# Patient Record
Sex: FEMALE | Race: WHITE | Employment: OTHER | URBAN - METROPOLITAN AREA
[De-identification: names, ages, dates, MRNs, and addresses within clinical notes are randomized per-mention and may not be internally consistent; named-entity substitution may affect disease eponyms.]

---

## 2017-01-06 ENCOUNTER — HOSPITAL ENCOUNTER (OUTPATIENT)
Dept: MAMMOGRAPHY | Age: 62
Discharge: HOME | End: 2017-01-06
Attending: FAMILY MEDICINE
Payer: COMMERCIAL

## 2017-01-06 DIAGNOSIS — Z12.31 VISIT FOR SCREENING MAMMOGRAM: ICD-10-CM

## 2017-01-06 PROCEDURE — 77067 SCR MAMMO BI INCL CAD: CPT

## 2018-03-02 ENCOUNTER — HOSPITAL ENCOUNTER (OUTPATIENT)
Dept: MAMMOGRAPHY | Age: 63
Discharge: HOME | End: 2018-03-02
Attending: FAMILY MEDICINE
Payer: COMMERCIAL

## 2018-03-02 DIAGNOSIS — Z12.31 VISIT FOR SCREENING MAMMOGRAM: ICD-10-CM

## 2018-03-02 DIAGNOSIS — R92.8 ABNORMAL MAMMOGRAM: ICD-10-CM

## 2018-03-02 PROCEDURE — 77065 DX MAMMO INCL CAD UNI: CPT

## 2018-03-02 PROCEDURE — 76642 ULTRASOUND BREAST LIMITED: CPT

## 2018-03-02 PROCEDURE — 77067 SCR MAMMO BI INCL CAD: CPT

## 2018-09-04 ENCOUNTER — HOSPITAL ENCOUNTER (OUTPATIENT)
Dept: MAMMOGRAPHY | Age: 63
Discharge: HOME | End: 2018-09-04
Attending: FAMILY MEDICINE
Payer: COMMERCIAL

## 2018-09-04 DIAGNOSIS — R92.8 ABNORMAL MAMMOGRAM: ICD-10-CM

## 2018-09-04 PROCEDURE — 77065 DX MAMMO INCL CAD UNI: CPT

## 2019-03-15 ENCOUNTER — HOSPITAL ENCOUNTER (OUTPATIENT)
Dept: MAMMOGRAPHY | Age: 64
Discharge: HOME | End: 2019-03-15
Attending: FAMILY MEDICINE
Payer: COMMERCIAL

## 2019-03-15 DIAGNOSIS — Z12.39 SCREENING BREAST EXAMINATION: ICD-10-CM

## 2019-03-15 PROCEDURE — 77067 SCR MAMMO BI INCL CAD: CPT

## 2020-04-03 LAB
CREATININE, EXTERNAL: 0.83
HBA1C MFR BLD HPLC: 5.4 %
LDL-C, EXTERNAL: 51

## 2020-05-26 ENCOUNTER — HOSPITAL ENCOUNTER (OUTPATIENT)
Dept: MAMMOGRAPHY | Age: 65
Discharge: HOME | End: 2020-05-26
Attending: FAMILY MEDICINE
Payer: COMMERCIAL

## 2020-05-26 DIAGNOSIS — Z12.31 VISIT FOR SCREENING MAMMOGRAM: ICD-10-CM

## 2020-05-26 PROCEDURE — 77067 SCR MAMMO BI INCL CAD: CPT

## 2020-07-27 RX ORDER — CHLORTHALIDONE 25 MG/1
TABLET ORAL
Qty: 90 TAB | Refills: 1 | Status: SHIPPED | OUTPATIENT
Start: 2020-07-27 | End: 2021-02-15

## 2020-09-03 ENCOUNTER — TELEPHONE (OUTPATIENT)
Dept: PRIMARY CARE CLINIC | Age: 65
End: 2020-09-03

## 2020-09-04 ENCOUNTER — TELEPHONE (OUTPATIENT)
Dept: PRIMARY CARE CLINIC | Age: 65
End: 2020-09-04

## 2020-09-04 NOTE — TELEPHONE ENCOUNTER
Her [facility] paper work can be virtual. We have sooner appointment available for victuals. Please reschedule for an sooner appt.  Thank you

## 2020-09-18 VITALS
BODY MASS INDEX: 38.98 KG/M2 | HEIGHT: 63 IN | HEART RATE: 72 BPM | SYSTOLIC BLOOD PRESSURE: 132 MMHG | TEMPERATURE: 97.4 F | DIASTOLIC BLOOD PRESSURE: 88 MMHG | RESPIRATION RATE: 16 BRPM | WEIGHT: 220 LBS | OXYGEN SATURATION: 95 %

## 2020-09-18 PROBLEM — E55.9 VITAMIN D DEFICIENCY: Status: ACTIVE | Noted: 2020-09-18

## 2020-09-18 PROBLEM — E66.9 OBESITY (BMI 30-39.9): Status: ACTIVE | Noted: 2020-09-18

## 2020-09-18 RX ORDER — SITAGLIPTIN AND METFORMIN HYDROCHLORIDE 50; 1000 MG/1; MG/1
1 TABLET, FILM COATED ORAL 2 TIMES DAILY WITH MEALS
COMMUNITY
End: 2021-02-25

## 2020-09-18 RX ORDER — CETIRIZINE HYDROCHLORIDE 10 MG/1
CAPSULE, LIQUID FILLED ORAL
Status: ON HOLD | COMMUNITY
End: 2021-07-27

## 2020-09-18 RX ORDER — PREDNISONE 20 MG/1
TABLET ORAL
COMMUNITY
End: 2020-09-23

## 2020-09-18 RX ORDER — SODIUM FLUORIDE 5 MG/G
PASTE, DENTIFRICE DENTAL
COMMUNITY

## 2020-09-18 RX ORDER — EZETIMIBE 10 MG/1
10 TABLET ORAL
COMMUNITY
End: 2021-12-17

## 2020-09-18 RX ORDER — FLUOCINONIDE 0.5 MG/G
CREAM TOPICAL 2 TIMES DAILY
COMMUNITY

## 2020-09-18 RX ORDER — BLOOD-GLUCOSE METER
EACH MISCELLANEOUS
COMMUNITY

## 2020-09-18 RX ORDER — LANCETS 33 GAUGE
EACH MISCELLANEOUS
COMMUNITY

## 2020-09-18 RX ORDER — MONTELUKAST SODIUM 10 MG/1
10 TABLET ORAL
COMMUNITY
End: 2021-12-17

## 2020-09-18 RX ORDER — METFORMIN HYDROCHLORIDE 1000 MG/1
1000 TABLET ORAL 2 TIMES DAILY WITH MEALS
COMMUNITY
End: 2020-09-23

## 2020-09-18 RX ORDER — FLUTICASONE PROPIONATE 50 MCG
2 SPRAY, SUSPENSION (ML) NASAL DAILY
COMMUNITY

## 2020-09-18 RX ORDER — ROSUVASTATIN CALCIUM 5 MG/1
TABLET, COATED ORAL
COMMUNITY
End: 2020-09-23 | Stop reason: SDUPTHER

## 2020-09-23 ENCOUNTER — VIRTUAL VISIT (OUTPATIENT)
Dept: PRIMARY CARE CLINIC | Age: 65
End: 2020-09-23
Payer: COMMERCIAL

## 2020-09-23 DIAGNOSIS — M17.0 PRIMARY OSTEOARTHRITIS OF BOTH KNEES: ICD-10-CM

## 2020-09-23 DIAGNOSIS — J45.40 MODERATE PERSISTENT ASTHMA WITHOUT COMPLICATION: Chronic | ICD-10-CM

## 2020-09-23 DIAGNOSIS — E55.9 VITAMIN D DEFICIENCY: ICD-10-CM

## 2020-09-23 DIAGNOSIS — E78.00 PURE HYPERCHOLESTEROLEMIA: ICD-10-CM

## 2020-09-23 DIAGNOSIS — E11.9 TYPE 2 DIABETES MELLITUS WITHOUT COMPLICATION, WITHOUT LONG-TERM CURRENT USE OF INSULIN (HCC): Chronic | ICD-10-CM

## 2020-09-23 DIAGNOSIS — I10 ESSENTIAL HYPERTENSION, BENIGN: Primary | ICD-10-CM

## 2020-09-23 PROCEDURE — 99214 OFFICE O/P EST MOD 30 MIN: CPT | Performed by: FAMILY MEDICINE

## 2020-09-23 RX ORDER — PREDNISONE 20 MG/1
60 TABLET ORAL
Qty: 30 TAB | Refills: 0 | Status: SHIPPED | OUTPATIENT
Start: 2020-09-23 | End: 2020-10-03

## 2020-09-23 RX ORDER — ROSUVASTATIN CALCIUM 5 MG/1
5 TABLET, COATED ORAL
Qty: 90 TAB | Refills: 1 | Status: SHIPPED | OUTPATIENT
Start: 2020-09-23

## 2020-09-23 RX ORDER — NAPROXEN 250 MG/1
500 TABLET ORAL 2 TIMES DAILY WITH MEALS
Qty: 180 TAB | Refills: 1 | Status: SHIPPED | OUTPATIENT
Start: 2020-09-23 | End: 2021-12-29

## 2020-09-23 NOTE — PROGRESS NOTES
HISTORY OF PRESENT ILLNESS    Hyperlipidemia:    Lab Results   Component Value Date/Time    Cholesterol, total 128 10/01/2020 08:17 AM    HDL Cholesterol 47 10/01/2020 08:17 AM    LDL, calculated 98 03/16/2011 11:05 AM    LDL Chol Calc (NIH) 65 10/01/2020 08:17 AM    VLDL, calculated 19 03/16/2011 11:05 AM    VLDL Cholesterol Vin 16 10/01/2020 08:17 AM    Triglyceride 81 10/01/2020 08:17 AM    On crestor 5 mg, no addl arthlagias or myalgias. DM : after steroids BS is 200. Normall fasting 71 lowest  She has seen. Avg 90 120. Weight now 225 lbs. FMLA paperwork discussion. I have kept her out of office bc of covid risk. asthma,  oa -severe the knees, needs surgery but putting off for skilled nursing. [facility] [employer] [facility] but they have moved her for perventative covid contact. in office  currently2 weeks for training,but is isolated/   Work in  office one day per week . OA knees - and will work to the end of  Next year before she gets her knee replacement. . On naproxen daily    Asthma - not currently active. 1 exacerbation this year compared to prior yrs      Jeremiah Méndez is a 75 y.o. female.   Past Medical History:   Diagnosis Date    Acne     Anemia     Arrhythmia     murmur    Asthma     Cancer ([address]     malignant melanoma of skin on back    DDD (degenerative disc disease), lumbar     Heart palpitations     Hypercholesterolemia     Hypertension     Hypokalemia     Insomnia     Osteoarthrosis, unspecified whether generalized or localized, unspecified site 3/16/2011    Polycystic ovaries     Seasonal allergies     Type II diabetes mellitus with complication, uncontrolled ([address] 9/18/2020    Vitamin D deficiency      Social History     Tobacco Use    Smoking status: Never Smoker    Smokeless tobacco: Never Used   Substance Use Topics    Alcohol use: No    Drug use: Never       Family History   Problem Relation Age of Onset    Heart Disease Father     Diabetes Brother     Heart Disease Brother        Review of Systems   Constitutional: Positive for weight loss. Negative for chills, fever and malaise/fatigue. HENT: Negative for congestion. Eyes: Negative for blurred vision. Respiratory: Negative for cough, sputum production, shortness of breath and wheezing. Cardiovascular: Negative for chest pain, palpitations, orthopnea, leg swelling and PND. Gastrointestinal: Negative for abdominal pain, diarrhea, heartburn and nausea. Musculoskeletal: Positive for joint pain. Negative for falls. Skin: Negative for rash. Neurological: Negative for dizziness, tingling, weakness and headaches. Endo/Heme/Allergies: Positive for environmental allergies. Psychiatric/Behavioral: Negative for depression. The patient is nervous/anxious. The patient does not have insomnia. Physical Exam  Nursing note reviewed. Constitutional:       Appearance: Normal appearance. She is obese. HENT:      Head: Normocephalic and atraumatic. Eyes:      Extraocular Movements: Extraocular movements intact. Conjunctiva/sclera: Conjunctivae normal.   Pulmonary:      Effort: Pulmonary effort is normal. No respiratory distress. Abdominal:      Palpations: Abdomen is soft. Neurological:      General: No focal deficit present. Mental Status: She is alert and oriented to person, place, and time. Psychiatric:         Attention and Perception: Attention and perception normal.         Mood and Affect: Mood is anxious. Speech: Speech is tangential.         Behavior: Behavior normal.         Thought Content: Thought content normal. Thought content is not paranoid or delusional.         Judgment: Judgment normal.           ASSESSMENT and PLAN  Diagnoses and all orders for this visit:    1. Essential hypertension, benign  -     METABOLIC PANEL, COMPREHENSIVE  -     CBC WITH AUTOMATED DIFF  -     LIPID PANEL    2.  Type 2 diabetes mellitus without complication, without long-term current use of insulin ([facility] [address]  Comments:  doing well on meds. no concerns. Orders:  -     METABOLIC PANEL, COMPREHENSIVE  -     CBC WITH AUTOMATED DIFF  -     LIPID PANEL  -     HEMOGLOBIN A1C WITH EAG  -     MICROALBUMIN, UR, RAND W/ MICROALB/CREAT RATIO    3. Vitamin D deficiency  -     VITAMIN D, 25 HYDROXY    4. Pure hypercholesterolemia  -     METABOLIC PANEL, COMPREHENSIVE  -     LIPID PANEL    5. Primary osteoarthritis of both knees  -     URIC ACID    6. Moderate persistent asthma without complication  Comments:  much better since symbicort & singulair added. less exacerbations. will fill out FMLA. goes downhill fast when activates. RX prn  predniosne. Other orders  -     rosuvastatin (CRESTOR) 5 mg tablet; Take 1 Tab by mouth nightly. -     predniSONE (DELTASONE) 20 mg tablet; Take 60 mg by mouth daily (with breakfast) for 10 days. Indications: worsening asthma  -     naproxen (NAPROSYN) 250 mg tablet; Take 2 Tabs by mouth two (2) times daily (with meals). Orders Placed This Encounter    METABOLIC PANEL, COMPREHENSIVE    CBC WITH AUTOMATED DIFF    LIPID PANEL    HEMOGLOBIN A1C WITH EAG    VITAMIN D, 25 HYDROXY    MICROALBUMIN, UR, RAND W/ MICROALB/CREAT RATIO    URIC ACID    vit C/E/Zn/coppr/lutein/zeaxan (PRESERVISION AREDS-2 PO)    rosuvastatin (CRESTOR) 5 mg tablet    predniSONE (DELTASONE) 20 mg tablet    naproxen (NAPROSYN) 250 mg tablet     Follow-up and Dispositions    · Return in about 6 months (around 3/23/2021) for Chronic office visit.

## 2020-10-02 LAB
25(OH)D3+25(OH)D2 SERPL-MCNC: 56.5 NG/ML (ref 30–100)
ALBUMIN SERPL-MCNC: 4.4 G/DL (ref 3.8–4.8)
ALBUMIN/CREAT UR: 9 MG/G CREAT (ref 0–29)
ALBUMIN/GLOB SERPL: 2.8 {RATIO} (ref 1.2–2.2)
ALP SERPL-CCNC: 71 IU/L (ref 39–117)
ALT SERPL-CCNC: 11 IU/L (ref 0–32)
AST SERPL-CCNC: 13 IU/L (ref 0–40)
BASOPHILS # BLD AUTO: 0 X10E3/UL (ref 0–0.2)
BASOPHILS NFR BLD AUTO: 1 %
BILIRUB SERPL-MCNC: 0.6 MG/DL (ref 0–1.2)
BUN SERPL-MCNC: 19 MG/DL (ref 8–27)
BUN/CREAT SERPL: 21 (ref 12–28)
CALCIUM SERPL-MCNC: 9.6 MG/DL (ref 8.7–10.3)
CHLORIDE SERPL-SCNC: 97 MMOL/L (ref 96–106)
CHOLEST SERPL-MCNC: 128 MG/DL (ref 100–199)
CO2 SERPL-SCNC: 30 MMOL/L (ref 20–29)
CREAT SERPL-MCNC: 0.91 MG/DL (ref 0.57–1)
CREAT UR-MCNC: 108.1 MG/DL
EOSINOPHIL # BLD AUTO: 0.1 X10E3/UL (ref 0–0.4)
EOSINOPHIL NFR BLD AUTO: 2 %
ERYTHROCYTE [DISTWIDTH] IN BLOOD BY AUTOMATED COUNT: 12.6 % (ref 11.7–15.4)
EST. AVERAGE GLUCOSE BLD GHB EST-MCNC: 114 MG/DL
GLOBULIN SER CALC-MCNC: 1.6 G/DL (ref 1.5–4.5)
GLUCOSE SERPL-MCNC: 105 MG/DL (ref 65–99)
HBA1C MFR BLD: 5.6 % (ref 4.8–5.6)
HCT VFR BLD AUTO: 37.1 % (ref 34–46.6)
HDLC SERPL-MCNC: 47 MG/DL
HGB BLD-MCNC: 12.4 G/DL (ref 11.1–15.9)
IMM GRANULOCYTES # BLD AUTO: 0 X10E3/UL (ref 0–0.1)
IMM GRANULOCYTES NFR BLD AUTO: 0 %
LDLC SERPL CALC-MCNC: 65 MG/DL (ref 0–99)
LYMPHOCYTES # BLD AUTO: 0.8 X10E3/UL (ref 0.7–3.1)
LYMPHOCYTES NFR BLD AUTO: 12 %
MCH RBC QN AUTO: 29 PG (ref 26.6–33)
MCHC RBC AUTO-ENTMCNC: 33.4 G/DL (ref 31.5–35.7)
MCV RBC AUTO: 87 FL (ref 79–97)
MICROALBUMIN UR-MCNC: 9.4 UG/ML
MONOCYTES # BLD AUTO: 0.4 X10E3/UL (ref 0.1–0.9)
MONOCYTES NFR BLD AUTO: 7 %
NEUTROPHILS # BLD AUTO: 5 X10E3/UL (ref 1.4–7)
NEUTROPHILS NFR BLD AUTO: 78 %
PLATELET # BLD AUTO: 234 X10E3/UL (ref 150–450)
POTASSIUM SERPL-SCNC: 3.6 MMOL/L (ref 3.5–5.2)
PROT SERPL-MCNC: 6 G/DL (ref 6–8.5)
RBC # BLD AUTO: 4.27 X10E6/UL (ref 3.77–5.28)
SODIUM SERPL-SCNC: 139 MMOL/L (ref 134–144)
TRIGL SERPL-MCNC: 81 MG/DL (ref 0–149)
URATE SERPL-MCNC: 5.3 MG/DL (ref 2.5–7.1)
VLDLC SERPL CALC-MCNC: 16 MG/DL (ref 5–40)
WBC # BLD AUTO: 6.4 X10E3/UL (ref 3.4–10.8)

## 2020-10-02 NOTE — PROGRESS NOTES
Normal cbc, cmp, vitamin D. LDL 65 at goal.  Great a1c at 5.6.   Neg mciroalbumin and normal uric acid

## 2021-02-15 RX ORDER — CHLORTHALIDONE 25 MG/1
TABLET ORAL
Qty: 90 TAB | Refills: 0 | Status: SHIPPED | OUTPATIENT
Start: 2021-02-15

## 2021-03-09 DIAGNOSIS — J45.909 EXTRINSIC ASTHMA: ICD-10-CM

## 2021-03-09 RX ORDER — ALBUTEROL SULFATE 90 UG/1
2 AEROSOL, METERED RESPIRATORY (INHALATION)
Qty: 3 INHALER | Refills: 1 | Status: SHIPPED | OUTPATIENT
Start: 2021-03-09

## 2021-03-09 RX ORDER — FLUTICASONE PROPIONATE AND SALMETEROL 250; 50 UG/1; UG/1
1 POWDER RESPIRATORY (INHALATION) EVERY 12 HOURS
Qty: 3 INHALER | Refills: 1 | Status: SHIPPED | OUTPATIENT
Start: 2021-03-09

## 2021-04-08 ENCOUNTER — TRANSCRIBE ORDER (OUTPATIENT)
Dept: SCHEDULING | Age: 66
End: 2021-04-08

## 2021-04-08 DIAGNOSIS — Z12.31 SCREENING MAMMOGRAM FOR HIGH-RISK PATIENT: Primary | ICD-10-CM

## 2021-04-29 RX ORDER — NAPROXEN 250 MG/1
TABLET ORAL
Qty: 180 TAB | Refills: 0 | OUTPATIENT
Start: 2021-04-29

## 2021-05-12 RX ORDER — SITAGLIPTIN AND METFORMIN HYDROCHLORIDE 50; 1000 MG/1; MG/1
TABLET, FILM COATED ORAL
Qty: 180 TAB | Refills: 0 | Status: SHIPPED | OUTPATIENT
Start: 2021-05-12

## 2021-06-07 ENCOUNTER — HOSPITAL ENCOUNTER (OUTPATIENT)
Dept: MAMMOGRAPHY | Age: 66
Discharge: HOME | End: 2021-06-07
Payer: MEDICARE

## 2021-06-07 DIAGNOSIS — Z12.31 SCREENING MAMMOGRAM FOR HIGH-RISK PATIENT: ICD-10-CM

## 2021-06-07 PROCEDURE — 77067 SCR MAMMO BI INCL CAD: CPT

## 2021-07-15 RX ORDER — PREGABALIN 75 MG/1
75 CAPSULE ORAL ONCE
Status: CANCELLED | OUTPATIENT
Start: 2021-07-15 | End: 2021-07-15

## 2021-07-15 RX ORDER — ACETAMINOPHEN 500 MG
1000 TABLET ORAL ONCE
Status: CANCELLED | OUTPATIENT
Start: 2021-07-15 | End: 2021-07-15

## 2021-07-15 RX ORDER — CELECOXIB 200 MG/1
200 CAPSULE ORAL ONCE
Status: CANCELLED | OUTPATIENT
Start: 2021-07-15 | End: 2021-07-15

## 2021-07-19 NOTE — PERIOP NOTES
PT FULLY VACCINATED   MODERNA:3-10-21 AND 4-8-21  [facility]

## 2021-07-21 ENCOUNTER — HOSPITAL ENCOUNTER (OUTPATIENT)
Dept: PREADMISSION TESTING | Age: 66
Discharge: HOME | End: 2021-07-21
Payer: MEDICARE

## 2021-07-21 VITALS
SYSTOLIC BLOOD PRESSURE: 132 MMHG | DIASTOLIC BLOOD PRESSURE: 74 MMHG | BODY MASS INDEX: 40.27 KG/M2 | TEMPERATURE: 97.9 F | HEART RATE: 74 BPM | WEIGHT: 227.29 LBS | HEIGHT: 63 IN | RESPIRATION RATE: 18 BRPM

## 2021-07-21 LAB
ABO + RH BLD: NORMAL
APPEARANCE UR: CLEAR
ATRIAL RATE: 60 BPM
BACTERIA URNS QL MICRO: NEGATIVE /HPF
BILIRUB UR QL: NEGATIVE
BLOOD GROUP ANTIBODIES SERPL: NORMAL
CALCULATED P AXIS, ECG09: -3 DEGREES
CALCULATED R AXIS, ECG10: -14 DEGREES
CALCULATED T AXIS, ECG11: 15 DEGREES
COLOR UR: ABNORMAL
DIAGNOSIS, 93000: NORMAL
EPITH CASTS URNS QL MICRO: ABNORMAL /LPF
GLUCOSE UR STRIP.AUTO-MCNC: NEGATIVE MG/DL
HGB UR QL STRIP: NEGATIVE
INR PPP: 1 (ref 0.9–1.1)
KETONES UR QL STRIP.AUTO: NEGATIVE MG/DL
LEUKOCYTE ESTERASE UR QL STRIP.AUTO: ABNORMAL
NITRITE UR QL STRIP.AUTO: NEGATIVE
P-R INTERVAL, ECG05: 178 MS
PH UR STRIP: 6 [PH] (ref 5–8)
PROT UR STRIP-MCNC: NEGATIVE MG/DL
PROTHROMBIN TIME: 10.8 SEC (ref 9–11.1)
Q-T INTERVAL, ECG07: 430 MS
QRS DURATION, ECG06: 88 MS
QTC CALCULATION (BEZET), ECG08: 430 MS
RBC #/AREA URNS HPF: ABNORMAL /HPF (ref 0–5)
SP GR UR REFRACTOMETRY: 1.01 (ref 1–1.03)
SPECIMEN EXP DATE BLD: NORMAL
UA: UC IF INDICATED,UAUC: ABNORMAL
UROBILINOGEN UR QL STRIP.AUTO: 1 EU/DL (ref 0.2–1)
VENTRICULAR RATE, ECG03: 60 BPM
WBC URNS QL MICRO: ABNORMAL /HPF (ref 0–4)

## 2021-07-21 PROCEDURE — 86900 BLOOD TYPING SEROLOGIC ABO: CPT

## 2021-07-21 PROCEDURE — 85610 PROTHROMBIN TIME: CPT

## 2021-07-21 PROCEDURE — 81001 URINALYSIS AUTO W/SCOPE: CPT

## 2021-07-21 PROCEDURE — 93005 ELECTROCARDIOGRAM TRACING: CPT

## 2021-07-21 PROCEDURE — 36415 COLL VENOUS BLD VENIPUNCTURE: CPT

## 2021-07-21 NOTE — PERIOP NOTES
Preoperative and medication instructions reviewed with patient , surgical site infection sheet given,  MRSA instructions given and reviewed chg soap x 2 given and instructions on how to use chg soap correctly. Patient given opportunity to ask questions and all questions were answered. Information given   and arrival to hospital on day of surgery    CALLED PATIENT REGARDING COVID RECORD ONLY HAD ONE DATE, STATES COPY 2ND DOSE WAS STAMPED AND IT IS LIGHT, I WILL BRING [facility] ON DAY OF SURGERY.  PER PATIENT DATES OF ADMINISTRATION WERE 03/10/2021 AND 04/08/2021

## 2021-07-22 LAB
BACTERIA SPEC CULT: NORMAL
BACTERIA SPEC CULT: NORMAL
SERVICE CMNT-IMP: NORMAL

## 2021-07-22 NOTE — PERIOP NOTES
PAT Nurse Practitioner   Pre-Operative Chart Review/Assessment:-ORTHOPEDIC                Patient Name:  Mariana Ramirez                                                           Age:   77 y.o.    :  1955     Today's Date:  2021     Date of PAT:   2021      Date of Surgery:    2021      Procedure(s):  Left Total Knee Arthroplasty     Surgeon:   Dr. Vale Burnett:      1)  Medical Clearance:  Dr. Desiree Wells      2)  Cardiac Clearance:  EKG and METs reviewed. No further cardiac evaluation requested. 3)  Diabetic Treatment Consult:  Not indicated. A1c-5.6      4)  Sleep Apnea evaluation:   Not indicated.  JOHNNA Score 2.       5) Treatment for MRSA/Staph Aureus:  Neg      6) Additional Concerns:  PONV, HTN, Asthma, T2DM, hx of anemia, obesity                Vital Signs:         Vitals:    21 0841   BP: 132/74   Pulse: 74   Resp: 18   Temp: 97.9 °F (36.6 °C)   Weight: 103.1 kg (227 lb 4.7 oz)   Height: 5' 3\" (1.6 m)            ____________________________________________  PAST MEDICAL HISTORY  Past Medical History:   Diagnosis Date    Acne     Anemia     Arrhythmia     murmur    Asthma     Cancer ([facility] [address]     malignant melanoma of skin on back    DDD (degenerative disc disease), lumbar     Heart palpitations     Hypercholesterolemia     Hypertension     Hypokalemia     Insomnia     Nausea & vomiting     Osteoarthrosis, unspecified whether generalized or localized, unspecified site 3/16/2011    Polycystic ovaries     Seasonal allergies     Type II diabetes mellitus with complication, uncontrolled ([facility] [address] 2020    Vitamin D deficiency       ____________________________________________  PAST SURGICAL HISTORY  Past Surgical History:   Procedure Laterality Date    HX COLONOSCOPY      HX DILATION AND CURETTAGE      HX HYSTERECTOMY  1986    parital    HX OOPHORECTOMY Right 1986    HX TONSILLECTOMY      [address] ____________________________________________  HOME MEDICATIONS  Current Outpatient Medications   Medication Sig    Janumet 50-1,000 mg per tablet TAKE 1 TABLET TWICE A DAY    fluticasone propion-salmeteroL (Advair Diskus) 250-50 mcg/dose diskus inhaler Take 1 Puff by inhalation every twelve (12) hours.  albuterol (PROVENTIL HFA, VENTOLIN HFA, PROAIR HFA) 90 mcg/actuation inhaler Take 2 Puffs by inhalation every four (4) hours as needed for Wheezing. Indications: asthma attack    chlorthalidone (HYGROTON) 25 mg tablet TAKE ONE TABLET BY MOUTH DAILY    vit C/E/Zn/coppr/lutein/zeaxan (PRESERVISION AREDS-2 PO) Take  by mouth.  naproxen (NAPROSYN) 250 mg tablet Take 2 Tabs by mouth two (2) times daily (with meals).  fluocinoNIDE (LIDEX) 0.05 % topical cream Apply  to affected area two (2) times a day.  montelukast (SINGULAIR) 10 mg tablet Take 10 mg by mouth nightly as needed.  lancets (One Touch Delica) 33 gauge misc by Does Not Apply route.  glucose blood VI test strips (OneTouch Ultra Blue Test Strip) strip by Does Not Apply route See Admin Instructions.  Blood-Glucose Meter (OneTouch Ultra2 Meter) misc by Does Not Apply route.  Inhalational Spacing Device (POCKET CHAMBER) by Does Not Apply route as needed.  clobetasol (TEMOVATE) 0.05 % ointment Apply  to affected area two (2) times a day.  thiamine (VITAMIN B-1) 100 mg tablet Take  by mouth daily.  aspirin delayed-release (ASPIR-81) 81 mg tablet Take  by mouth daily.  rosuvastatin (CRESTOR) 5 mg tablet Take 1 Tab by mouth nightly.  fluoride, sodium, (Denta 5000 Plus) 1.1 % crea by Dental route.  ezetimibe (ZETIA) 10 mg tablet Take 10 mg by mouth every morning.  fluticasone propionate (Flonase Allergy Relief) 50 mcg/actuation nasal spray 2 Sprays by Both Nostrils route daily.  Cetirizine (ZyrTEC) 10 mg cap Take  by mouth.     albuterol (PROVENTIL, VENTOLIN) 90 mcg/Actuation inhaler Take 2 Puffs by inhalation every four (4) hours as needed.  cholecalciferol, vitamin D3, (VITAMIN D-3) 2,000 unit Tab Take  by mouth. No current facility-administered medications for this encounter.      ____________________________________________  ALLERGIES  Allergies   Allergen Reactions    Sulfa (Sulfonamide Antibiotics) Hives    Tape [Adhesive] Other (comments)     RASH AND TAPE BURNS    Wellbutrin [Bupropion Hcl] Other (comments)     Hypertensive crisis      ____________________________________________  SOCIAL HISTORY  Social History     Tobacco Use    Smoking status: Never Smoker    Smokeless tobacco: Never Used   Substance Use Topics    Alcohol use: No      ____________________________________________        Labs:     Hospital Outpatient Visit on 07/21/2021   Component Date Value Ref Range Status    Crossmatch Expiration 07/21/2021 07/30/2021,2359   Final    ABO/Rh(D) 07/21/2021 A POSITIVE   Final    Antibody screen 07/21/2021 NEG   Final    INR 07/21/2021 1.0  0.9 - 1.1   Final    A single therapeutic range for Vit K antagonists may not be optimal for all indications - see June, 2008 issue of Chest, American College of Chest Physicians Evidence-Based Clinical Practice Guidelines, 8th Edition.     Prothrombin time 07/21/2021 10.8  9.0 - 11.1 sec Final    Color 07/21/2021 YELLOW/STRAW    Final    Color Reference Range: Straw, Yellow or Dark Yellow    Appearance 07/21/2021 CLEAR  CLEAR   Final    Specific gravity 07/21/2021 1.015  1.003 - 1.030   Final    pH (UA) 07/21/2021 6.0  5.0 - 8.0   Final    Protein 07/21/2021 Negative  NEG mg/dL Final    Glucose 07/21/2021 Negative  NEG mg/dL Final    Ketone 07/21/2021 Negative  NEG mg/dL Final    Bilirubin 07/21/2021 Negative  NEG   Final    Blood 07/21/2021 Negative  NEG   Final    Urobilinogen 07/21/2021 1.0  0.2 - 1.0 EU/dL Final    Nitrites 07/21/2021 Negative  NEG   Final    Leukocyte Esterase 07/21/2021 SMALL* NEG   Final    WBC 07/21/2021 0-4  0 - 4 /hpf Final  RBC 07/21/2021 0-5  0 - 5 /hpf Final    Epithelial cells 07/21/2021 FEW  FEW /lpf Final    Epithelial cell category consists of squamous cells and /or transitional urothelial cells. Renal tubular cells, if present, are separately identified as such.  Bacteria 07/21/2021 Negative  NEG /hpf Final    UA:UC IF INDICATED 07/21/2021 CULTURE NOT INDICATED BY UA RESULT  CNI   Final    Ventricular Rate 07/21/2021 60  BPM Final    Atrial Rate 07/21/2021 60  BPM Final    P-R Interval 07/21/2021 178  ms Final    QRS Duration 07/21/2021 88  ms Final    Q-T Interval 07/21/2021 430  ms Final    QTC Calculation (Bezet) 07/21/2021 430  ms Final    Calculated P Axis 07/21/2021 -3  degrees Final    Calculated R Axis 07/21/2021 -14  degrees Final    Calculated T Axis 07/21/2021 15  degrees Final    Diagnosis 07/21/2021    Final                    Value:Normal sinus rhythm  Minimal voltage criteria for LVH, may be normal variant  Borderline ECG  No previous ECGs available  Confirmed by Juliane Jean Baptiste ([id number]) on 7/21/2021 9:58:43 AM      Special Requests: 07/21/2021 NO SPECIAL REQUESTS    Final    Culture result: 07/21/2021 MRSA NOT PRESENT    Final       Skin:     Denies open wounds, cuts, sores, rashes or other areas of concern in PAT assessment.           Cristobal Miller NP

## 2021-07-27 ENCOUNTER — HOSPITAL ENCOUNTER (OUTPATIENT)
Age: 66
Discharge: HOME HEALTH | End: 2021-07-28
Attending: ORTHOPAEDIC SURGERY | Admitting: ORTHOPAEDIC SURGERY
Payer: MEDICARE

## 2021-07-27 ENCOUNTER — ANESTHESIA EVENT (OUTPATIENT)
Dept: SURGERY | Age: 66
End: 2021-07-27
Payer: MEDICARE

## 2021-07-27 ENCOUNTER — ANESTHESIA (OUTPATIENT)
Dept: SURGERY | Age: 66
End: 2021-07-27
Payer: MEDICARE

## 2021-07-27 DIAGNOSIS — M17.12 PRIMARY OSTEOARTHRITIS OF LEFT KNEE: Primary | ICD-10-CM

## 2021-07-27 LAB
GLUCOSE BLD STRIP.AUTO-MCNC: 112 MG/DL (ref 65–117)
GLUCOSE BLD STRIP.AUTO-MCNC: 236 MG/DL (ref 65–117)
SERVICE CMNT-IMP: ABNORMAL
SERVICE CMNT-IMP: NORMAL

## 2021-07-27 PROCEDURE — 77030006807 HC BLD SAW RECIP KMET -B: Performed by: ORTHOPAEDIC SURGERY

## 2021-07-27 PROCEDURE — 77030005513 HC CATH URETH FOL11 MDII -B: Performed by: ORTHOPAEDIC SURGERY

## 2021-07-27 PROCEDURE — 97161 PT EVAL LOW COMPLEX 20 MIN: CPT

## 2021-07-27 PROCEDURE — 74011250636 HC RX REV CODE- 250/636: Performed by: PHYSICIAN ASSISTANT

## 2021-07-27 PROCEDURE — 74011250636 HC RX REV CODE- 250/636: Performed by: NURSE ANESTHETIST, CERTIFIED REGISTERED

## 2021-07-27 PROCEDURE — 77030010507 HC ADH SKN DERMBND J&J -B: Performed by: ORTHOPAEDIC SURGERY

## 2021-07-27 PROCEDURE — 77030028907 HC WRP KNEE WO BGS SOLM -B

## 2021-07-27 PROCEDURE — 77030018673: Performed by: ORTHOPAEDIC SURGERY

## 2021-07-27 PROCEDURE — 76060000036 HC ANESTHESIA 2.5 TO 3 HR: Performed by: ORTHOPAEDIC SURGERY

## 2021-07-27 PROCEDURE — 74011250637 HC RX REV CODE- 250/637: Performed by: PHYSICIAN ASSISTANT

## 2021-07-27 PROCEDURE — 2709999900 HC NON-CHARGEABLE SUPPLY: Performed by: ORTHOPAEDIC SURGERY

## 2021-07-27 PROCEDURE — C1776 JOINT DEVICE (IMPLANTABLE): HCPCS | Performed by: ORTHOPAEDIC SURGERY

## 2021-07-27 PROCEDURE — C1713 ANCHOR/SCREW BN/BN,TIS/BN: HCPCS | Performed by: ORTHOPAEDIC SURGERY

## 2021-07-27 PROCEDURE — 74011000250 HC RX REV CODE- 250: Performed by: ORTHOPAEDIC SURGERY

## 2021-07-27 PROCEDURE — 77030002933 HC SUT MCRYL J&J -A: Performed by: ORTHOPAEDIC SURGERY

## 2021-07-27 PROCEDURE — 77030000032 HC CUF TRNQT ZIMM -B: Performed by: ORTHOPAEDIC SURGERY

## 2021-07-27 PROCEDURE — 74011636637 HC RX REV CODE- 636/637: Performed by: PHYSICIAN ASSISTANT

## 2021-07-27 PROCEDURE — 77030012935 HC DRSG AQUACEL BMS -B: Performed by: ORTHOPAEDIC SURGERY

## 2021-07-27 PROCEDURE — 74011000250 HC RX REV CODE- 250: Performed by: PHYSICIAN ASSISTANT

## 2021-07-27 PROCEDURE — 76210000006 HC OR PH I REC 0.5 TO 1 HR: Performed by: ORTHOPAEDIC SURGERY

## 2021-07-27 PROCEDURE — 77030040750 HC INSTR NAV SYS DISP ORLN -G: Performed by: ORTHOPAEDIC SURGERY

## 2021-07-27 PROCEDURE — 82962 GLUCOSE BLOOD TEST: CPT

## 2021-07-27 PROCEDURE — 77030039497 HC CST PAD STERILE CHCS -A: Performed by: ORTHOPAEDIC SURGERY

## 2021-07-27 PROCEDURE — 74011250636 HC RX REV CODE- 250/636: Performed by: ANESTHESIOLOGY

## 2021-07-27 PROCEDURE — 77030014077 HC TOWER MX CEM J&J -C: Performed by: ORTHOPAEDIC SURGERY

## 2021-07-27 PROCEDURE — 77030007866 HC KT SPN ANES BBMI -B: Performed by: ANESTHESIOLOGY

## 2021-07-27 PROCEDURE — 97116 GAIT TRAINING THERAPY: CPT

## 2021-07-27 PROCEDURE — 77030033067 HC SUT PDO STRATFX SPIR J&J -B: Performed by: ORTHOPAEDIC SURGERY

## 2021-07-27 PROCEDURE — 77030006835 HC BLD SAW SAG STRY -B: Performed by: ORTHOPAEDIC SURGERY

## 2021-07-27 PROCEDURE — 77030031139 HC SUT VCRL2 J&J -A: Performed by: ORTHOPAEDIC SURGERY

## 2021-07-27 PROCEDURE — 76010000172 HC OR TIME 2.5 TO 3 HR INTENSV-TIER 1: Performed by: ORTHOPAEDIC SURGERY

## 2021-07-27 PROCEDURE — 2709999900 HC NON-CHARGEABLE SUPPLY

## 2021-07-27 PROCEDURE — 74011250636 HC RX REV CODE- 250/636: Performed by: ORTHOPAEDIC SURGERY

## 2021-07-27 PROCEDURE — 74011250637 HC RX REV CODE- 250/637: Performed by: ORTHOPAEDIC SURGERY

## 2021-07-27 PROCEDURE — 74011000250 HC RX REV CODE- 250: Performed by: NURSE ANESTHETIST, CERTIFIED REGISTERED

## 2021-07-27 PROCEDURE — 74011000258 HC RX REV CODE- 258: Performed by: NURSE ANESTHETIST, CERTIFIED REGISTERED

## 2021-07-27 DEVICE — DOMED TRI-PEG PATELLA, 35X9MM, E-PLUS
Type: IMPLANTABLE DEVICE | Site: KNEE | Status: FUNCTIONAL
Brand: DJO SURGICAL

## 2021-07-27 DEVICE — EMPOWR 3D KNEETM INS, 8L 14MM, VE
Type: IMPLANTABLE DEVICE | Site: KNEE | Status: FUNCTIONAL
Brand: DJO SURGICAL

## 2021-07-27 DEVICE — SMARTSET GMV HIGH PERFORMANCE GENTAMICIN MEDIUM VISCOSITY BONE CEMENT 40G
Type: IMPLANTABLE DEVICE | Site: KNEE | Status: FUNCTIONAL
Brand: SMARTSET

## 2021-07-27 DEVICE — DJO EMPOWR KNEETM, FIN BP, NP, 8L
Type: IMPLANTABLE DEVICE | Site: KNEE | Status: FUNCTIONAL
Brand: DJO SURGICAL

## 2021-07-27 DEVICE — EMPOWR 3D KNEETM FEMUR, NP, 8L
Type: IMPLANTABLE DEVICE | Site: KNEE | Status: FUNCTIONAL
Brand: DJO SURGICAL

## 2021-07-27 RX ORDER — SODIUM CHLORIDE 0.9 % (FLUSH) 0.9 %
5-40 SYRINGE (ML) INJECTION AS NEEDED
Status: DISCONTINUED | OUTPATIENT
Start: 2021-07-27 | End: 2021-07-27 | Stop reason: HOSPADM

## 2021-07-27 RX ORDER — HYDROMORPHONE HYDROCHLORIDE 1 MG/ML
0.5 INJECTION, SOLUTION INTRAMUSCULAR; INTRAVENOUS; SUBCUTANEOUS
Status: DISCONTINUED | OUTPATIENT
Start: 2021-07-27 | End: 2021-07-28 | Stop reason: HOSPADM

## 2021-07-27 RX ORDER — FENTANYL CITRATE 50 UG/ML
50 INJECTION, SOLUTION INTRAMUSCULAR; INTRAVENOUS AS NEEDED
Status: DISCONTINUED | OUTPATIENT
Start: 2021-07-27 | End: 2021-07-27 | Stop reason: HOSPADM

## 2021-07-27 RX ORDER — PROPOFOL 10 MG/ML
INJECTION, EMULSION INTRAVENOUS
Status: DISCONTINUED | OUTPATIENT
Start: 2021-07-27 | End: 2021-07-27 | Stop reason: HOSPADM

## 2021-07-27 RX ORDER — AMOXICILLIN 250 MG
1 CAPSULE ORAL
Qty: 60 TABLET | Refills: 0 | Status: SHIPPED | OUTPATIENT
Start: 2021-07-27 | End: 2021-12-17

## 2021-07-27 RX ORDER — FENTANYL CITRATE 50 UG/ML
INJECTION, SOLUTION INTRAMUSCULAR; INTRAVENOUS AS NEEDED
Status: DISCONTINUED | OUTPATIENT
Start: 2021-07-27 | End: 2021-07-27 | Stop reason: HOSPADM

## 2021-07-27 RX ORDER — DEXAMETHASONE SODIUM PHOSPHATE 4 MG/ML
INJECTION, SOLUTION INTRA-ARTICULAR; INTRALESIONAL; INTRAMUSCULAR; INTRAVENOUS; SOFT TISSUE AS NEEDED
Status: DISCONTINUED | OUTPATIENT
Start: 2021-07-27 | End: 2021-07-27 | Stop reason: HOSPADM

## 2021-07-27 RX ORDER — SODIUM CHLORIDE 9 MG/ML
125 INJECTION, SOLUTION INTRAVENOUS CONTINUOUS
Status: DISCONTINUED | OUTPATIENT
Start: 2021-07-27 | End: 2021-07-28 | Stop reason: HOSPADM

## 2021-07-27 RX ORDER — OXYCODONE HYDROCHLORIDE 5 MG/1
5 TABLET ORAL
Status: DISCONTINUED | OUTPATIENT
Start: 2021-07-27 | End: 2021-07-28 | Stop reason: HOSPADM

## 2021-07-27 RX ORDER — PREGABALIN 75 MG/1
75 CAPSULE ORAL ONCE
Status: COMPLETED | OUTPATIENT
Start: 2021-07-27 | End: 2021-07-27

## 2021-07-27 RX ORDER — MIDAZOLAM HYDROCHLORIDE 1 MG/ML
1 INJECTION, SOLUTION INTRAMUSCULAR; INTRAVENOUS AS NEEDED
Status: DISCONTINUED | OUTPATIENT
Start: 2021-07-27 | End: 2021-07-27 | Stop reason: HOSPADM

## 2021-07-27 RX ORDER — ROPIVACAINE HYDROCHLORIDE 5 MG/ML
INJECTION, SOLUTION EPIDURAL; INFILTRATION; PERINEURAL
Status: COMPLETED | OUTPATIENT
Start: 2021-07-27 | End: 2021-07-27

## 2021-07-27 RX ORDER — ASPIRIN 81 MG/1
81 TABLET ORAL 2 TIMES DAILY
Qty: 60 TABLET | Refills: 0 | Status: SHIPPED
Start: 2021-07-27 | End: 2021-12-29

## 2021-07-27 RX ORDER — SODIUM CHLORIDE 0.9 % (FLUSH) 0.9 %
5-40 SYRINGE (ML) INJECTION EVERY 8 HOURS
Status: DISCONTINUED | OUTPATIENT
Start: 2021-07-27 | End: 2021-07-27 | Stop reason: HOSPADM

## 2021-07-27 RX ORDER — SODIUM CHLORIDE, SODIUM LACTATE, POTASSIUM CHLORIDE, CALCIUM CHLORIDE 600; 310; 30; 20 MG/100ML; MG/100ML; MG/100ML; MG/100ML
25 INJECTION, SOLUTION INTRAVENOUS CONTINUOUS
Status: DISCONTINUED | OUTPATIENT
Start: 2021-07-27 | End: 2021-07-27 | Stop reason: HOSPADM

## 2021-07-27 RX ORDER — CELECOXIB 200 MG/1
200 CAPSULE ORAL ONCE
Status: COMPLETED | OUTPATIENT
Start: 2021-07-27 | End: 2021-07-27

## 2021-07-27 RX ORDER — EZETIMIBE 10 MG/1
10 TABLET ORAL
Status: DISCONTINUED | OUTPATIENT
Start: 2021-07-28 | End: 2021-07-28 | Stop reason: HOSPADM

## 2021-07-27 RX ORDER — FACIAL-BODY WIPES
10 EACH TOPICAL DAILY PRN
Status: DISCONTINUED | OUTPATIENT
Start: 2021-07-29 | End: 2021-07-28 | Stop reason: HOSPADM

## 2021-07-27 RX ORDER — SODIUM CHLORIDE, SODIUM LACTATE, POTASSIUM CHLORIDE, CALCIUM CHLORIDE 600; 310; 30; 20 MG/100ML; MG/100ML; MG/100ML; MG/100ML
125 INJECTION, SOLUTION INTRAVENOUS CONTINUOUS
Status: DISCONTINUED | OUTPATIENT
Start: 2021-07-27 | End: 2021-07-27 | Stop reason: HOSPADM

## 2021-07-27 RX ORDER — OXYCODONE HYDROCHLORIDE 5 MG/1
2.5 TABLET ORAL
Status: DISCONTINUED | OUTPATIENT
Start: 2021-07-27 | End: 2021-07-28 | Stop reason: HOSPADM

## 2021-07-27 RX ORDER — OXYCODONE HYDROCHLORIDE 5 MG/1
5 TABLET ORAL AS NEEDED
Status: DISCONTINUED | OUTPATIENT
Start: 2021-07-27 | End: 2021-07-27 | Stop reason: HOSPADM

## 2021-07-27 RX ORDER — LIDOCAINE HYDROCHLORIDE 10 MG/ML
0.1 INJECTION, SOLUTION EPIDURAL; INFILTRATION; INTRACAUDAL; PERINEURAL AS NEEDED
Status: DISCONTINUED | OUTPATIENT
Start: 2021-07-27 | End: 2021-07-27 | Stop reason: HOSPADM

## 2021-07-27 RX ORDER — ONDANSETRON 2 MG/ML
4 INJECTION INTRAMUSCULAR; INTRAVENOUS AS NEEDED
Status: DISCONTINUED | OUTPATIENT
Start: 2021-07-27 | End: 2021-07-27 | Stop reason: HOSPADM

## 2021-07-27 RX ORDER — MIDAZOLAM HYDROCHLORIDE 1 MG/ML
0.5 INJECTION, SOLUTION INTRAMUSCULAR; INTRAVENOUS
Status: DISCONTINUED | OUTPATIENT
Start: 2021-07-27 | End: 2021-07-27 | Stop reason: HOSPADM

## 2021-07-27 RX ORDER — KETOROLAC TROMETHAMINE 30 MG/ML
15 INJECTION, SOLUTION INTRAMUSCULAR; INTRAVENOUS EVERY 6 HOURS
Status: DISCONTINUED | OUTPATIENT
Start: 2021-07-27 | End: 2021-07-28 | Stop reason: HOSPADM

## 2021-07-27 RX ORDER — MORPHINE SULFATE 2 MG/ML
2 INJECTION, SOLUTION INTRAMUSCULAR; INTRAVENOUS
Status: DISCONTINUED | OUTPATIENT
Start: 2021-07-27 | End: 2021-07-27 | Stop reason: HOSPADM

## 2021-07-27 RX ORDER — PROPOFOL 10 MG/ML
INJECTION, EMULSION INTRAVENOUS AS NEEDED
Status: DISCONTINUED | OUTPATIENT
Start: 2021-07-27 | End: 2021-07-27 | Stop reason: HOSPADM

## 2021-07-27 RX ORDER — ACETAMINOPHEN 500 MG
500 TABLET ORAL EVERY 4 HOURS
Qty: 100 TABLET | Refills: 0 | Status: SHIPPED
Start: 2021-07-27

## 2021-07-27 RX ORDER — ASPIRIN 81 MG/1
81 TABLET ORAL 2 TIMES DAILY
Status: DISCONTINUED | OUTPATIENT
Start: 2021-07-27 | End: 2021-07-28 | Stop reason: HOSPADM

## 2021-07-27 RX ORDER — MAGNESIUM SULFATE 100 %
4 CRYSTALS MISCELLANEOUS AS NEEDED
Status: DISCONTINUED | OUTPATIENT
Start: 2021-07-27 | End: 2021-07-28 | Stop reason: HOSPADM

## 2021-07-27 RX ORDER — ONDANSETRON 2 MG/ML
INJECTION INTRAMUSCULAR; INTRAVENOUS AS NEEDED
Status: DISCONTINUED | OUTPATIENT
Start: 2021-07-27 | End: 2021-07-27 | Stop reason: HOSPADM

## 2021-07-27 RX ORDER — KETAMINE HYDROCHLORIDE 10 MG/ML
INJECTION, SOLUTION INTRAMUSCULAR; INTRAVENOUS AS NEEDED
Status: DISCONTINUED | OUTPATIENT
Start: 2021-07-27 | End: 2021-07-27 | Stop reason: HOSPADM

## 2021-07-27 RX ORDER — DIPHENHYDRAMINE HYDROCHLORIDE 50 MG/ML
12.5 INJECTION, SOLUTION INTRAMUSCULAR; INTRAVENOUS AS NEEDED
Status: DISCONTINUED | OUTPATIENT
Start: 2021-07-27 | End: 2021-07-27 | Stop reason: HOSPADM

## 2021-07-27 RX ORDER — FENTANYL CITRATE 50 UG/ML
25 INJECTION, SOLUTION INTRAMUSCULAR; INTRAVENOUS
Status: DISCONTINUED | OUTPATIENT
Start: 2021-07-27 | End: 2021-07-27 | Stop reason: HOSPADM

## 2021-07-27 RX ORDER — AMOXICILLIN 250 MG
1 CAPSULE ORAL 2 TIMES DAILY
Status: DISCONTINUED | OUTPATIENT
Start: 2021-07-27 | End: 2021-07-28 | Stop reason: HOSPADM

## 2021-07-27 RX ORDER — HYDROXYZINE HYDROCHLORIDE 10 MG/1
10 TABLET, FILM COATED ORAL
Status: DISCONTINUED | OUTPATIENT
Start: 2021-07-27 | End: 2021-07-28 | Stop reason: HOSPADM

## 2021-07-27 RX ORDER — BUPIVACAINE HYDROCHLORIDE 7.5 MG/ML
INJECTION, SOLUTION EPIDURAL; RETROBULBAR AS NEEDED
Status: DISCONTINUED | OUTPATIENT
Start: 2021-07-27 | End: 2021-07-27

## 2021-07-27 RX ORDER — OXYCODONE HYDROCHLORIDE 5 MG/1
2.5-5 TABLET ORAL
Qty: 42 TABLET | Refills: 0 | Status: SHIPPED | OUTPATIENT
Start: 2021-07-27 | End: 2021-08-03

## 2021-07-27 RX ORDER — INSULIN LISPRO 100 [IU]/ML
INJECTION, SOLUTION INTRAVENOUS; SUBCUTANEOUS
Status: DISCONTINUED | OUTPATIENT
Start: 2021-07-27 | End: 2021-07-28 | Stop reason: HOSPADM

## 2021-07-27 RX ORDER — SODIUM CHLORIDE 9 MG/ML
25 INJECTION, SOLUTION INTRAVENOUS CONTINUOUS
Status: DISCONTINUED | OUTPATIENT
Start: 2021-07-27 | End: 2021-07-27 | Stop reason: HOSPADM

## 2021-07-27 RX ORDER — HYDROMORPHONE HYDROCHLORIDE 1 MG/ML
0.2 INJECTION, SOLUTION INTRAMUSCULAR; INTRAVENOUS; SUBCUTANEOUS
Status: DISCONTINUED | OUTPATIENT
Start: 2021-07-27 | End: 2021-07-27 | Stop reason: HOSPADM

## 2021-07-27 RX ORDER — DEXTROSE 50 % IN WATER (D50W) INTRAVENOUS SYRINGE
12.5-25 AS NEEDED
Status: DISCONTINUED | OUTPATIENT
Start: 2021-07-27 | End: 2021-07-28 | Stop reason: HOSPADM

## 2021-07-27 RX ORDER — BUPIVACAINE HYDROCHLORIDE 5 MG/ML
INJECTION, SOLUTION EPIDURAL; INTRACAUDAL AS NEEDED
Status: DISCONTINUED | OUTPATIENT
Start: 2021-07-27 | End: 2021-07-27 | Stop reason: HOSPADM

## 2021-07-27 RX ORDER — SODIUM CHLORIDE, SODIUM LACTATE, POTASSIUM CHLORIDE, CALCIUM CHLORIDE 600; 310; 30; 20 MG/100ML; MG/100ML; MG/100ML; MG/100ML
INJECTION, SOLUTION INTRAVENOUS
Status: DISCONTINUED | OUTPATIENT
Start: 2021-07-27 | End: 2021-07-27 | Stop reason: HOSPADM

## 2021-07-27 RX ORDER — SODIUM CHLORIDE 0.9 % (FLUSH) 0.9 %
5-40 SYRINGE (ML) INJECTION AS NEEDED
Status: DISCONTINUED | OUTPATIENT
Start: 2021-07-27 | End: 2021-07-28 | Stop reason: HOSPADM

## 2021-07-27 RX ORDER — ACETAMINOPHEN 500 MG
500 TABLET ORAL
Status: DISCONTINUED | OUTPATIENT
Start: 2021-07-27 | End: 2021-07-28 | Stop reason: HOSPADM

## 2021-07-27 RX ORDER — ACETAMINOPHEN 500 MG
1000 TABLET ORAL ONCE
Status: COMPLETED | OUTPATIENT
Start: 2021-07-27 | End: 2021-07-27

## 2021-07-27 RX ORDER — NALOXONE HYDROCHLORIDE 0.4 MG/ML
0.4 INJECTION, SOLUTION INTRAMUSCULAR; INTRAVENOUS; SUBCUTANEOUS AS NEEDED
Status: DISCONTINUED | OUTPATIENT
Start: 2021-07-27 | End: 2021-07-28 | Stop reason: HOSPADM

## 2021-07-27 RX ORDER — SODIUM CHLORIDE 9 MG/ML
INJECTION, SOLUTION INTRAVENOUS
Status: DISCONTINUED | OUTPATIENT
Start: 2021-07-27 | End: 2021-07-27 | Stop reason: HOSPADM

## 2021-07-27 RX ORDER — ONDANSETRON 2 MG/ML
4 INJECTION INTRAMUSCULAR; INTRAVENOUS
Status: DISCONTINUED | OUTPATIENT
Start: 2021-07-27 | End: 2021-07-28 | Stop reason: HOSPADM

## 2021-07-27 RX ORDER — ACETAMINOPHEN 325 MG/1
650 TABLET ORAL ONCE
Status: DISCONTINUED | OUTPATIENT
Start: 2021-07-27 | End: 2021-07-27 | Stop reason: SDUPTHER

## 2021-07-27 RX ORDER — ROSUVASTATIN CALCIUM 10 MG/1
5 TABLET, COATED ORAL
Status: DISCONTINUED | OUTPATIENT
Start: 2021-07-27 | End: 2021-07-28 | Stop reason: HOSPADM

## 2021-07-27 RX ORDER — POLYETHYLENE GLYCOL 3350 17 G/17G
17 POWDER, FOR SOLUTION ORAL DAILY
Status: DISCONTINUED | OUTPATIENT
Start: 2021-07-28 | End: 2021-07-28 | Stop reason: HOSPADM

## 2021-07-27 RX ORDER — SODIUM CHLORIDE 0.9 % (FLUSH) 0.9 %
5-40 SYRINGE (ML) INJECTION EVERY 8 HOURS
Status: DISCONTINUED | OUTPATIENT
Start: 2021-07-27 | End: 2021-07-28 | Stop reason: HOSPADM

## 2021-07-27 RX ORDER — MIDAZOLAM HYDROCHLORIDE 1 MG/ML
INJECTION, SOLUTION INTRAMUSCULAR; INTRAVENOUS AS NEEDED
Status: DISCONTINUED | OUTPATIENT
Start: 2021-07-27 | End: 2021-07-27 | Stop reason: HOSPADM

## 2021-07-27 RX ADMIN — PREGABALIN 75 MG: 75 CAPSULE ORAL at 09:47

## 2021-07-27 RX ADMIN — PROPOFOL 20 MG: 10 INJECTION, EMULSION INTRAVENOUS at 10:41

## 2021-07-27 RX ADMIN — PROPOFOL 40 MCG/KG/MIN: 10 INJECTION, EMULSION INTRAVENOUS at 10:39

## 2021-07-27 RX ADMIN — FENTANYL CITRATE 100 MCG: 50 INJECTION, SOLUTION INTRAMUSCULAR; INTRAVENOUS at 10:10

## 2021-07-27 RX ADMIN — PROPOFOL 20 MG: 10 INJECTION, EMULSION INTRAVENOUS at 10:39

## 2021-07-27 RX ADMIN — OXYCODONE 5 MG: 5 TABLET ORAL at 20:46

## 2021-07-27 RX ADMIN — DOCUSATE SODIUM 50 MG AND SENNOSIDES 8.6 MG 1 TABLET: 8.6; 5 TABLET, FILM COATED ORAL at 21:06

## 2021-07-27 RX ADMIN — ACETAMINOPHEN 1000 MG: 500 TABLET ORAL at 09:46

## 2021-07-27 RX ADMIN — TRANEXAMIC ACID 1 G: 100 INJECTION, SOLUTION INTRAVENOUS at 10:59

## 2021-07-27 RX ADMIN — DEXAMETHASONE SODIUM PHOSPHATE 8 MG: 4 INJECTION, SOLUTION INTRAMUSCULAR; INTRAVENOUS at 11:06

## 2021-07-27 RX ADMIN — ACETAMINOPHEN 500 MG: 500 TABLET ORAL at 18:00

## 2021-07-27 RX ADMIN — MIDAZOLAM 2 MG: 1 INJECTION INTRAMUSCULAR; INTRAVENOUS at 10:35

## 2021-07-27 RX ADMIN — ONDANSETRON HYDROCHLORIDE 4 MG: 2 INJECTION, SOLUTION INTRAMUSCULAR; INTRAVENOUS at 11:48

## 2021-07-27 RX ADMIN — SODIUM CHLORIDE: 900 INJECTION, SOLUTION INTRAVENOUS at 12:22

## 2021-07-27 RX ADMIN — SODIUM CHLORIDE, POTASSIUM CHLORIDE, SODIUM LACTATE AND CALCIUM CHLORIDE 25 ML/HR: 600; 310; 30; 20 INJECTION, SOLUTION INTRAVENOUS at 10:00

## 2021-07-27 RX ADMIN — KETAMINE HYDROCHLORIDE 20 MG: 10 INJECTION, SOLUTION INTRAMUSCULAR; INTRAVENOUS at 10:39

## 2021-07-27 RX ADMIN — SODIUM CHLORIDE 125 ML/HR: 9 INJECTION, SOLUTION INTRAVENOUS at 19:21

## 2021-07-27 RX ADMIN — OXYCODONE 5 MG: 5 TABLET ORAL at 23:38

## 2021-07-27 RX ADMIN — KETAMINE HYDROCHLORIDE 10 MG: 10 INJECTION, SOLUTION INTRAMUSCULAR; INTRAVENOUS at 12:15

## 2021-07-27 RX ADMIN — Medication 10 ML: at 17:00

## 2021-07-27 RX ADMIN — BUPIVACAINE HYDROCHLORIDE 10 MG: 5 INJECTION, SOLUTION EPIDURAL; INTRACAUDAL; PERINEURAL at 10:42

## 2021-07-27 RX ADMIN — MIDAZOLAM HYDROCHLORIDE 2 MG: 1 INJECTION, SOLUTION INTRAMUSCULAR; INTRAVENOUS at 10:10

## 2021-07-27 RX ADMIN — CELECOXIB 200 MG: 200 CAPSULE ORAL at 09:47

## 2021-07-27 RX ADMIN — PHENYLEPHRINE HYDROCHLORIDE 20 MCG/MIN: 10 INJECTION INTRAVENOUS at 11:17

## 2021-07-27 RX ADMIN — WATER 2 G: 1 INJECTION INTRAMUSCULAR; INTRAVENOUS; SUBCUTANEOUS at 10:59

## 2021-07-27 RX ADMIN — ACETAMINOPHEN 500 MG: 500 TABLET ORAL at 21:07

## 2021-07-27 RX ADMIN — ASPIRIN 81 MG: 81 TABLET, COATED ORAL at 21:06

## 2021-07-27 RX ADMIN — SODIUM CHLORIDE, POTASSIUM CHLORIDE, SODIUM LACTATE AND CALCIUM CHLORIDE: 600; 310; 30; 20 INJECTION, SOLUTION INTRAVENOUS at 10:16

## 2021-07-27 RX ADMIN — OXYCODONE 5 MG: 5 TABLET ORAL at 16:58

## 2021-07-27 RX ADMIN — ROSUVASTATIN CALCIUM 5 MG: 10 TABLET, COATED ORAL at 21:07

## 2021-07-27 RX ADMIN — FENTANYL CITRATE 50 MCG: 50 INJECTION, SOLUTION INTRAMUSCULAR; INTRAVENOUS at 10:37

## 2021-07-27 RX ADMIN — INSULIN LISPRO 2 UNITS: 100 INJECTION, SOLUTION INTRAVENOUS; SUBCUTANEOUS at 21:47

## 2021-07-27 RX ADMIN — KETOROLAC TROMETHAMINE 15 MG: 30 INJECTION, SOLUTION INTRAMUSCULAR at 20:48

## 2021-07-27 RX ADMIN — ROPIVACAINE HYDROCHLORIDE 20 ML: 5 INJECTION, SOLUTION EPIDURAL; INFILTRATION; PERINEURAL at 10:06

## 2021-07-27 RX ADMIN — CEFAZOLIN SODIUM 2 G: 1 INJECTION, POWDER, FOR SOLUTION INTRAMUSCULAR; INTRAVENOUS at 20:47

## 2021-07-27 NOTE — ANESTHESIA PREPROCEDURE EVALUATION
Relevant Problems   RESPIRATORY SYSTEM   (+) Moderate persistent asthma      CARDIOVASCULAR   (+) Essential hypertension, benign      ENDOCRINE   (+) Type 2 diabetes mellitus without complication, without long-term current use of insulin (HCC)       Anesthetic History     PONV          Review of Systems / Medical History  Patient summary reviewed, nursing notes reviewed and pertinent labs reviewed    Pulmonary            Asthma : well controlled       Neuro/Psych   Within defined limits           Cardiovascular    Hypertension        Dysrhythmias       Exercise tolerance: >4 METS     GI/Hepatic/Renal  Within defined limits              Endo/Other    Diabetes    Morbid obesity and arthritis     Other Findings              Physical Exam    Airway  Mallampati: II  TM Distance: > 6 cm  Neck ROM: normal range of motion   Mouth opening: Normal     Cardiovascular  Regular rate and rhythm,  S1 and S2 normal,  no murmur, click, rub, or gallop             Dental  No notable dental hx       Pulmonary  Breath sounds clear to auscultation               Abdominal  GI exam deferred       Other Findings            Anesthetic Plan    ASA: 3  Anesthesia type: MAC and spinal      Post-op pain plan if not by surgeon: peripheral nerve block single      Anesthetic plan and risks discussed with: Patient

## 2021-07-27 NOTE — ANESTHESIA PROCEDURE NOTES
Spinal Block    Performed by: Rose Mujica MD  Authorized by: Rose Mujica MD     Pre-procedure:   Indications: primary anesthetic  Preanesthetic Checklist: risks and benefits discussed and timeout performed      Spinal Block:   Patient Position:  Seated  Prep Region:  Lumbar  Prep: Betadine      Location:  L3-4  Technique:  Single shot        Needle:   Needle Type:  Pencil-tip  Needle Gauge:  25 G  Attempts:  1      Events: CSF confirmed, no blood with aspiration and no paresthesia        Assessment:  Insertion:  Uncomplicated  Patient tolerance:  Patient tolerated the procedure well with no immediate complications

## 2021-07-27 NOTE — DISCHARGE SUMMARY
[address]     DISCHARGE SUMMARY     Name: Elle Sotelo       MR#: [id number]    : 1955  ADMIT DATE: 2021  DISCHARGE DATE: 2021     ADMISSION DIAGNOSIS: Osteoarthritis of left knee [M17.12]     DISCHARGE DIAGNOSIS: OA LEFT KNEE     PROCEDURE PERFORMED: Procedure(s):  LEFT TOTAL KNEE ARTHROPLASTY (ANES SPINAL WITH IV SEDATION)     CONSULTATIONS:  None.     HISTORY OF PRESENT ILLNESS: The patient is a 69-year-old female with progressive left knee pain due to severe osteoarthritis. Symptoms have progressed despite comprehensive conservative treatment and she presents for left total knee replacement. Risks, benefits, alternatives of procedure reviewed with her in detail and she desires to proceed.     HOSPITAL COURSE:  The patient underwent the aforementioned procedure on date of admission under spinal anesthesia with adductor canal block. There were no immediate postoperative complications. She was started on a multimodal pain regimen and DVT prophylaxis.     DISPOSITION: The patient made slow, steady progress with physical therapy and was appropriate for discharge to Home in stable condition on postoperative day 1. DISCHARGE MEDICATIONS:  Reinitiate preadmission medications. In addition, the patient will be on ASA for DVT prophylaxis and low dose oxycodone and Tylenol for pain. DISCHARGE INSTRUCTIONS:  Detailed printed instructions were provided to the patient. Follow up with Dr. Annika Castaneda in approximately 3 weeks. The patient will receive home health physical therapy in the meantime.     Signed by: León Yan PA-C  2021

## 2021-07-27 NOTE — ANESTHESIA PROCEDURE NOTES
Peripheral Block    Start time: 7/27/2021 10:02 AM  End time: 7/27/2021 10:07 AM  Performed by: Salazar Latham MD  Authorized by: Salazar Latham MD       Pre-procedure: Indications: at surgeon's request and post-op pain management    Preanesthetic Checklist: patient identified, risks and benefits discussed, site marked, timeout performed and patient being monitored      Block Type:   Block Type:   Adductor canal  Laterality:  Left  Monitoring:  Standard ASA monitoring, continuous pulse ox, frequent vital sign checks, heart rate, responsive to questions and oxygen  Injection Technique:  Single shot  Procedures: ultrasound guided    Patient Position: supine  Prep: DuraPrep    Needle Type:  Stimuplex  Needle Gauge:  22 G  Needle Localization:  Ultrasound guidance  Medication Injected:  Ropivacaine (PF) (NAROPIN)(0.5%) 5 mg/mL injection, 20 mL  Med Admin Time: 7/27/2021 10:06 AM    Assessment:  Number of attempts:  1  Injection Assessment:  Incremental injection every 5 mL, local visualized surrounding nerve on ultrasound, negative aspiration for blood, no paresthesia and no intravascular symptoms  Patient tolerance:  Patient tolerated the procedure well with no immediate complications

## 2021-07-27 NOTE — ANESTHESIA POSTPROCEDURE EVALUATION
Post-Anesthesia Evaluation and Assessment    Patient: Benji Arnett MRN: [id number]  SSN: [id number]    YOB: 1955  Age: 77 y.o. Sex: female       Cardiovascular Function/Vital Signs  Visit Vitals  /69   Pulse (!) 58   Temp 36.5 °C (97.7 °F)   Resp 10   SpO2 99%       Patient is status post Spinal anesthesia for Procedure(s):  LEFT TOTAL KNEE ARTHROPLASTY (ANES SPINAL WITH IV SEDATION). Nausea/Vomiting: None    Postoperative hydration reviewed and adequate. Pain:  Pain Scale 1: Numeric (0 - 10) (07/27/21 0942)  Pain Intensity 1: 0 (07/27/21 0942)   Managed    Neurological Status:   Neuro (WDL): Within Defined Limits (07/27/21 1013)   At baseline    Mental Status and Level of Consciousness: Alert and oriented to person, place, and time    Pulmonary Status:   O2 Device: Nasal cannula (07/27/21 1311)   Adequate oxygenation and airway patent    Complications related to anesthesia: None    Post-anesthesia assessment completed. No concerns    Signed By: Kentrell Proctor MD     July 27, 2021              Procedure(s):  LEFT TOTAL KNEE ARTHROPLASTY (ANES SPINAL WITH IV SEDATION). MAC, spinal    <BSHSIANPOST>    INITIAL Post-op Vital signs:   Vitals Value Taken Time   /69 07/27/21 1330   Temp 36.5 °C (97.7 °F) 07/27/21 1311   Pulse 59 07/27/21 1333   Resp 20 07/27/21 1333   SpO2 97 % 07/27/21 1333   Vitals shown include unvalidated device data.

## 2021-07-27 NOTE — BRIEF OP NOTE
Brief Postoperative Note    Patient: Ilir Birmingham  YOB: 1955  MRN: [id number]    Date of Procedure: 7/27/2021     Pre-Op Diagnosis: OA LEFT KNEE    Post-Op Diagnosis: Same as preoperative diagnosis. Procedure(s):  LEFT TOTAL KNEE ARTHROPLASTY (ANES SPINAL WITH IV SEDATION)    Surgeon(s):  Gumaro Gautam MD    Surgical Assistant: Physician Assistant: James Hernandez PA-C  Surg Asst-1: Kaylan YAO    Anesthesia: Spinal     Estimated Blood Loss (mL): 514    Complications: None    Specimens: * No specimens in log *     Implants:   Implant Name Type Inv.  Item Serial No.  Lot No. LRB No. Used Action   CEMENT BNE GENTAMICIN 40 GM SMARTSET GMV - SNA  CEMENT BNE GENTAMICIN 40 GM SMARTSET GMV NA [facility] [employer] ORTHOPEDICS_ [id number] Left 2 Implanted   COMPONENT FEM SZ 8 LT KNEE NP EMPOWR 3D - SNA  COMPONENT FEM SZ 8 LT KNEE NP EMPOWR 3D NA [facility] MEDICAL - [employer] SURGICAL_ [id number] Left 1 Implanted   COMPONENT PATELLAR 3 PEG 35X9 MM KNEE DOMED POLYETH E-PLUS - SNA  COMPONENT PATELLAR 3 PEG 35X9 MM KNEE DOMED POLYETH E-PLUS NA [facility] - [employer] SURGICAL_ [id number] Left 1 Implanted   INSERT TIB SZ 8 [id number] LT KNEE EMPOWR 3D E + - SNA  INSERT TIB SZ 8 [id number] LT KNEE EMPOWR 3D E + NA [employer] - [employer] SURGICAL_ [id number] Left 1 Implanted   BASEPLATE TIB SZ 8 LT NP STEMMABLE EMPOWR - SNA  BASEPLATE TIB SZ 8 LT NP [facility] EMPOWR NA [facility] MEDICAL [facility] SURGICAL_ [id number] Left 1 Implanted       Drains: * No LDAs found *    Findings: oa left knee    Electronically Signed by Kathrine Hanks PA-C on 7/27/2021 at 12:55 PM

## 2021-07-27 NOTE — DISCHARGE INSTRUCTIONS
Post-op Discharge Instructions Following Total Joint Replacement  Maggy Stewart MD  [address]  [phone], ext [phone]  Follow-up Office Visit   See Dr. Alex Corrigan approximately 3-4 weeks from date of surgery. Call [phone], ext [phone] to make an appointment. Activity   Use your walker for ambulation. Weight bearing as tolerated unless instructed otherwise by the physical therapist. Get up every hour you are awake and take a brief walk. Lengthen walking distance daily as your strength improves.  Continue using your walker until seen in the office for your first follow up visit.  Practice your exercises 3 times daily as instructed by the physical therapist. [place] Ball[place] for 20 minutes after exercising.  No driving until seen in the office for your first follow up visit. Incision Care   The light brown Aquacel surgical dressing is waterproof and is to remain on your incision for 7 days. On the 7th day, carefully lift the edge of the dressing to break the adhesive seal and gently peel it off.  If your Aquacel dressings comes loose or falls off before the 7th day, replace it with a dry sterile gauze dressing and change this dressing daily. Once there is no drainage on the bandage, you mean leave the incision open to air.  You may take a shower with the Aquacel dressing in place. After you remove the Aquacel dressing on day 7, you may continue to shower and get your incision wet in the shower. Do not submerge your incision under water in a bathtub, hot tub, swimming pool, etc. until after you have been evaluated at your first office visit. Medications   Blood Clot Prevention: Take medication as prescribed by your physician for 4 weeks postop.  Pain Management: Take pain medication as prescribed; wean yourself off of pain medication as your pain lessens. Take with food.  You make also take Tylenol every 4-6 hours as needed for pain. Do not exceed 3 grams (3000mg) per day.    Place an ice bag on or around the incision for 20 minutes on / 20 minutes off as needed throughout the day and night, especially after exercising.  Stool Softener: You may want to take a stool softener (such as Senokot-S or Colace) to prevent constipation while taking pain medication. If constipation occurs, you may also use a laxative (such as Dulcolax tablets, Miralax, or a suppository). Diet   Resume usual diet at home. Drink plenty of fluids. Eat foods high in fiber and protein. Calcium and Vitamin D supplements recommended. Avoid alcoholic beverages. No smoking. When to call your Orthopaedic Surgeon: If you call after 5pm or on a weekend, the on call physician will return your call   Pain that is not relieved by pain medication, ice, and activity modification   Signs of infection (red incision, continuous drainage from the incision, malodorous drainage, persistent fever greater than 101 degrees Fahrenheit)   Signs of a blood clot in your leg (calf pain, tenderness, redness, and/or swelling of the lower leg)  ?   When to call your Primary Care Physician   Concerns about your medical conditions such as diabetes, high blood pressure, asthma, congestive heart failure   Call if blood sugars are elevated, if you have a persistent headache or dizziness, coughing or congestion, constipation or diarrhea, burning with urination, abnormal heart rate (fast or slow)  When to call 911 and go to the nearest Emergency Room   Acute onset of chest pain, shortness of breath, difficulty breathing

## 2021-07-27 NOTE — PROGRESS NOTES
Problem: Mobility Impaired (Adult and Pediatric)  Goal: *Acute Goals and Plan of Care (Insert Text)  Description: FUNCTIONAL STATUS PRIOR TO ADMISSION: Patient was independent and active without use of DME.    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 Physical Therapy Goals  Initiated 7/27/2021    1. Patient will move from supine to sit and sit to supine  and roll side to side in bed with modified independence within 4 days. 2. Patient will perform sit to stand with modified independence within 4 days. 3. Patient will ambulate with modified independence for 150 feet with the least restrictive device within 4 days. 4. Patient will ascend/descend 4 stairs with 1 handrail(s) with modified independence within 4 days. 5. Patient will perform home exercise program per protocol with modified independence within 4 days. 6. Patient will demonstrate AROM 0-90 degrees in operative joint within 4 days. Outcome: Progressing Towards Goal   PHYSICAL THERAPY EVALUATION  Patient: Anju Woodson (1 y.o. female)  Date: 7/27/2021  Primary Diagnosis: Osteoarthritis of left knee [M17.12]  Procedure(s) (LRB):  LEFT TOTAL KNEE ARTHROPLASTY (ANES SPINAL WITH IV SEDATION) (Left) Day of Surgery   Precautions:   Fall, WBAT      ASSESSMENT  Based on the objective data described below, the patient presents with decreased mobility compared to baseline after L TKA, POD0. She was able to ambulate only a very short distance with RW due to decreased control of her L knee in stance. Her VS remained stable and pain was well controlled overall with expected post op pain. Reviewed safety considerations for hospital stay and plan of care with patient and daughter, both of whom are healthcare providers. Expect that she will progress well as long as her pain remains under control.   She has a walker for home use already and will not need to manage stairs at her home.    Current Level of Function Impacting Discharge (mobility/balance): mod assist for minimal ambulation due to residual from spinal    Functional Outcome Measure: The patient scored Total: 55/100 on the Barthel Index which is indicative of moderately impaired ability to care for basic self needs/dependency on others. Other factors to consider for discharge: pain control, lives alone     Patient will benefit from skilled therapy intervention to address the above noted impairments. PLAN :  Recommendations and Planned Interventions: bed mobility training, transfer training, gait training, therapeutic exercises, patient and family training/education, and therapeutic activities      Frequency/Duration: Patient will be followed by physical therapy:  twice daily to address goals. Recommendation for discharge: (in order for the patient to meet his/her long term goals)  Physical therapy at least 2 days/week in the home     This discharge recommendation:  Has been made in collaboration with the attending provider and/or case management    IF patient discharges home will need the following DME: patient owns DME required for discharge         SUBJECTIVE:   Patient stated It is definitely sore.     OBJECTIVE DATA SUMMARY:   HISTORY:    Past Medical History:   Diagnosis Date    Acne     Anemia     Arrhythmia     murmur    Asthma     Cancer ([facility] [address]     malignant melanoma of skin on back    DDD (degenerative disc disease), lumbar     Heart palpitations     Hypercholesterolemia     Hypertension     Hypokalemia     Insomnia     Nausea & vomiting     Osteoarthrosis, unspecified whether generalized or localized, unspecified site 3/16/2011    Polycystic ovaries     Seasonal allergies     Type II diabetes mellitus with complication, uncontrolled ([address] 9/18/2020    Vitamin D deficiency      Past Surgical History:   Procedure Laterality Date    HX COLONOSCOPY  2010    HX DILATION AND CURETTAGE  1986    HX HYSTERECTOMY 1986    parital    HX OOPHORECTOMY Right 1986    HX TONSILLECTOMY      HX TUBAL LIGATION  1978       Personal factors and/or comorbidities impacting plan of care: as noted above    Home Situation  Home Environment: Private residence  # Steps to Enter: 0  One/Two Story Residence: One story  Living Alone: Yes  Support Systems: Child(john)  Patient Expects to be Discharged to[de-identified] House  Current DME Used/Available at Home: Cane, straight, Walker, rolling, Raised toilet seat, Grab bars, Shower chair, Commode, bedside  Tub or Shower Type: Shower    EXAMINATION/PRESENTATION/DECISION MAKING:   Critical Behavior:              Hearing:     Skin:  postop ace in place with no drainage  Edema: none  Range Of Motion:  AROM: Generally decreased, functional (L knee apporx 0-60 with ace in place)                       Strength:    Strength: Generally decreased, functional (LLE 3/5 post op, decreased control of extension in standing)                    Tone & Sensation:   Tone: Normal              Sensation: Intact               Coordination:  Coordination: Within functional limits  Vision:      Functional Mobility:  Bed Mobility:     Supine to Sit: Minimum assistance; Additional time  Sit to Supine: Additional time;Contact guard assistance     Transfers:  Sit to Stand: Minimum assistance; Adaptive equipment; Additional time  Stand to Sit: Minimum assistance; Adaptive equipment; Additional time                       Balance:   Sitting: Intact; Without support  Standing: Impaired; With support (hands on RW)  Standing - Static: Occasional;Fair  Standing - Dynamic : Constant support; Fair (decreased control of L knee in standing)  Ambulation/Gait Training:  Distance (ft): 5 Feet (ft)  Assistive Device: Gait belt;Walker, rolling  Ambulation - Level of Assistance: Moderate assistance; Adaptive equipment; Additional time        Gait Abnormalities: Antalgic;Decreased step clearance; Step to gait (decr control of L knee in stance)  Right Side Weight Bearing: Full  Left Side Weight Bearing: As tolerated  Base of Support: Widened;Shift to right  Stance: Left decreased  Speed/Vielka: Slow  Step Length: Right shortened;Left shortened  Swing Pattern: Left asymmetrical     Interventions: Safety awareness training; Tactile cues; Verbal cues; Visual/Demos            Stairs: Therapeutic Exercises: Ankle pumps, heel slides    Functional Measure:  Barthel Index:    Bathin  Bladder: 10  Bowels: 10  Groomin  Dressin  Feeding: 10  Mobility: 0  Stairs: 0  Toilet Use: 5  Transfer (Bed to Chair and Back): 10  Total: 55/100       The Barthel ADL Index: Guidelines  1. The index should be used as a record of what a patient does, not as a record of what a patient could do. 2. The main aim is to establish degree of independence from any help, physical or verbal, however minor and for whatever reason. 3. The need for supervision renders the patient not independent. 4. A patient's performance should be established using the best available evidence. Asking the patient, friends/relatives and nurses are the usual sources, but direct observation and common sense are also important. However direct testing is not needed. 5. Usually the patient's performance over the preceding 24-48 hours is important, but occasionally longer periods will be relevant. 6. Middle categories imply that the patient supplies over 50 per cent of the effort. 7. Use of aids to be independent is allowed. Juan Hill., Barthel, DAdrianneW. (). Functional evaluation: the Barthel Index. [facility] (14)2. KYLE Vázquez, Vernell Orlando., Janna Myers., [place], [address] [place] [address] (). Measuring the change indisability after inpatient rehabilitation; comparison of the responsiveness of the Barthel Index and Functional [place] Measure. Journal of Neurology, Neurosurgery, and Psychiatry, 66(4), 543-559.   Flor Yates, [place]INGRID, GERRY Vasquez, Billy Mead, M.A. (2004.) Assessment of post-stroke quality of life in cost-effectiveness studies: The usefulness of the Barthel Index and the EuroQoL-5D. Quality of Life Research, 15, 758-45        Physical Therapy Evaluation Charge Determination   History Examination Presentation Decision-Making   HIGH Complexity :3+ comorbidities / personal factors will impact the outcome/ POC  MEDIUM Complexity : 3 Standardized tests and measures addressing body structure, function, activity limitation and / or participation in recreation  MEDIUM Complexity : Evolving with changing characteristics  LOW Complexity : FOTO score of       Based on the above components, the patient evaluation is determined to be of the following complexity level: LOW     Pain Rating: Moderate pain L thigh, ice applied and pain meds given by RN    Activity Tolerance:   Fair    After treatment patient left in no apparent distress:   Supine in bed, Call bell within reach, Caregiver / family present, and Side rails x 3    COMMUNICATION/EDUCATION:   The patients plan of care was discussed with: Registered nurse. Fall prevention education was provided and the patient/caregiver indicated understanding., Patient/family have participated as able in goal setting and plan of care. , and Patient/family agree to work toward stated goals and plan of care.     Thank you for this referral.  Malissa Prakash, PT   Time Calculation: 19 mins

## 2021-07-27 NOTE — PERIOP NOTES
TRANSFER - OUT REPORT:    Verbal report given to RN (name) on Dwain White  being transferred to 566(unit) for routine post - op       Report consisted of patients Situation, Background, Assessment and   Recommendations(SBAR). Time Pre op antibiotic given:1059  Anesthesia Stop time: 5650  Desir Present on Transfer to floor:n  Order for Desir on Chart:n  Discharge Prescriptions with Chart:n    Information from the following report(s) SBAR, Kardex, Intake/Output and MAR was reviewed with the receiving nurse. Opportunity for questions and clarification was provided. Is the patient on 02? NO       L/Min n       Other n    Is the patient on a monitor? NO    Is the nurse transporting with the patient? NO    Surgical Waiting Area notified of patient's transfer from PACU? YES      Clothes cane and eyeglasses sent with pt.     The following personal items collected during your admission accompanied patient upon transfer:   Dental Appliance: Dental Appliances: None  Vision: Visual Aid: Glasses  Hearing Aid:    Jewelry:    Clothing: Clothing:  (belonging bag, glasses, cane sent to pacu)  Other Valuables:    Valuables sent to safe:

## 2021-07-28 VITALS
DIASTOLIC BLOOD PRESSURE: 73 MMHG | HEART RATE: 68 BPM | SYSTOLIC BLOOD PRESSURE: 118 MMHG | RESPIRATION RATE: 16 BRPM | OXYGEN SATURATION: 96 % | TEMPERATURE: 98.1 F

## 2021-07-28 LAB
ANION GAP SERPL CALC-SCNC: 6 MMOL/L (ref 5–15)
BUN SERPL-MCNC: 17 MG/DL (ref 6–20)
BUN/CREAT SERPL: 20 (ref 12–20)
CALCIUM SERPL-MCNC: 8.6 MG/DL (ref 8.5–10.1)
CHLORIDE SERPL-SCNC: 103 MMOL/L (ref 97–108)
CO2 SERPL-SCNC: 28 MMOL/L (ref 21–32)
CREAT SERPL-MCNC: 0.83 MG/DL (ref 0.55–1.02)
GLUCOSE BLD STRIP.AUTO-MCNC: 137 MG/DL (ref 65–117)
GLUCOSE SERPL-MCNC: 164 MG/DL (ref 65–100)
HGB BLD-MCNC: 9.7 G/DL (ref 11.5–16)
POTASSIUM SERPL-SCNC: 3.6 MMOL/L (ref 3.5–5.1)
SERVICE CMNT-IMP: ABNORMAL
SODIUM SERPL-SCNC: 137 MMOL/L (ref 136–145)

## 2021-07-28 PROCEDURE — 97116 GAIT TRAINING THERAPY: CPT | Performed by: PHYSICAL THERAPIST

## 2021-07-28 PROCEDURE — 74011250637 HC RX REV CODE- 250/637: Performed by: PHYSICIAN ASSISTANT

## 2021-07-28 PROCEDURE — 97110 THERAPEUTIC EXERCISES: CPT | Performed by: PHYSICAL THERAPIST

## 2021-07-28 PROCEDURE — 80048 BASIC METABOLIC PNL TOTAL CA: CPT

## 2021-07-28 PROCEDURE — 36415 COLL VENOUS BLD VENIPUNCTURE: CPT

## 2021-07-28 PROCEDURE — 74011000250 HC RX REV CODE- 250: Performed by: PHYSICIAN ASSISTANT

## 2021-07-28 PROCEDURE — 82962 GLUCOSE BLOOD TEST: CPT

## 2021-07-28 PROCEDURE — 74011250636 HC RX REV CODE- 250/636: Performed by: PHYSICIAN ASSISTANT

## 2021-07-28 PROCEDURE — 85018 HEMOGLOBIN: CPT

## 2021-07-28 RX ADMIN — ASPIRIN 81 MG: 81 TABLET, COATED ORAL at 08:42

## 2021-07-28 RX ADMIN — SODIUM CHLORIDE 125 ML/HR: 9 INJECTION, SOLUTION INTRAVENOUS at 03:10

## 2021-07-28 RX ADMIN — OXYCODONE 5 MG: 5 TABLET ORAL at 06:15

## 2021-07-28 RX ADMIN — ACETAMINOPHEN 500 MG: 500 TABLET ORAL at 06:15

## 2021-07-28 RX ADMIN — POLYETHYLENE GLYCOL 3350 17 G: 17 POWDER, FOR SOLUTION ORAL at 08:42

## 2021-07-28 RX ADMIN — ACETAMINOPHEN 500 MG: 500 TABLET ORAL at 09:50

## 2021-07-28 RX ADMIN — DOCUSATE SODIUM 50 MG AND SENNOSIDES 8.6 MG 1 TABLET: 8.6; 5 TABLET, FILM COATED ORAL at 08:42

## 2021-07-28 RX ADMIN — OXYCODONE 5 MG: 5 TABLET ORAL at 03:17

## 2021-07-28 RX ADMIN — KETOROLAC TROMETHAMINE 15 MG: 30 INJECTION, SOLUTION INTRAMUSCULAR at 08:42

## 2021-07-28 RX ADMIN — KETOROLAC TROMETHAMINE 15 MG: 30 INJECTION, SOLUTION INTRAMUSCULAR at 03:10

## 2021-07-28 RX ADMIN — CEFAZOLIN SODIUM 2 G: 1 INJECTION, POWDER, FOR SOLUTION INTRAMUSCULAR; INTRAVENOUS at 06:15

## 2021-07-28 RX ADMIN — OXYCODONE 5 MG: 5 TABLET ORAL at 09:50

## 2021-07-28 NOTE — PROGRESS NOTES
Problem: Mobility Impaired (Adult and Pediatric)  Goal: *Acute Goals and Plan of Care (Insert Text)  Description: FUNCTIONAL STATUS PRIOR TO ADMISSION: Patient was independent and active without use of DME.    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 Physical Therapy Goals  Initiated 7/27/2021    1. Patient will move from supine to sit and sit to supine  and roll side to side in bed with modified independence within 4 days. 2. Patient will perform sit to stand with modified independence within 4 days. 3. Patient will ambulate with modified independence for 150 feet with the least restrictive device within 4 days. 4. Patient will ascend/descend 4 stairs with 1 handrail(s) with modified independence within 4 days. 5. Patient will perform home exercise program per protocol with modified independence within 4 days. 6. Patient will demonstrate AROM 0-90 degrees in operative joint within 4 days. Outcome: Progressing Towards Goal   PHYSICAL THERAPY TREATMENT  Patient: Dean  (29 y.o. female)  Date: 7/28/2021  Diagnosis: Osteoarthritis of left knee [M17.12] <principal problem not specified>  Procedure(s) (LRB):  LEFT TOTAL KNEE ARTHROPLASTY (ANES SPINAL WITH IV SEDATION) (Left) 1 Day Post-Op  Precautions: Fall, WBAT  Chart, physical therapy assessment, plan of care and goals were reviewed. ASSESSMENT  Patient continues with skilled PT services and is progressing towards goals. Patient overall moving with supervision without difficulty. Reviewed HEP and use of ice at home. Currently able to increased gait distance to 125 feet with RW and CGA with no overt LOB or difficulty. Does not have stairs at home but wanted to practice stairs and able to complete 8 steps without difficulty. Patient's daughter is also a PT and is available to assist at home.   Patient has met all goals and is safe to DC home from a mobility standpoint. Patient is cleared for discharge from PT standpoint:  YES [x]     NO []       Other factors to consider for discharge: none         PLAN :  Patient continues to benefit from skilled intervention to address the above impairments. Continue treatment per established plan of care. to address goals. Recommendation for discharge: (in order for the patient to meet his/her long term goals)  Physical therapy at least 2 days/week in the home       IF patient discharges home will need the following DME: patient owns DME required for discharge       SUBJECTIVE:   Patient stated I feel pretty good actually.     OBJECTIVE DATA SUMMARY:   Critical Behavior:      Alert and oriented x 4              Functional Mobility Training:  Bed Mobility:     Supine to Sit: Modified independent  Sit to Supine: Modified independent           Transfers:  Sit to Stand: Supervision  Stand to Sit: Supervision                             Balance:  Sitting: Intact  Standing: Intact; With support  Ambulation/Gait Training:  Distance (ft): 125 Feet (ft)  Assistive Device: Gait belt;Walker, rolling  Ambulation - Level of Assistance: Stand-by assistance        Gait Abnormalities: Antalgic;Decreased step clearance; Step to gait     Left Side Weight Bearing: As tolerated     Stance: Left decreased  Speed/Vielka: Pace decreased (<100 feet/min); Slow  Step Length: Left shortened;Right shortened  Swing Pattern: Left asymmetrical       Stairs:  Number of Stairs Trained: 8  Stairs - Level of Assistance: Contact guard assistance   Rail Use: Both    Therapeutic Exercises:   Instructed in HEP per protocol    Pain Rating:  Reports pain but does not rate    Activity Tolerance:   Good and requires rest breaks    After treatment patient left in no apparent distress:   Supine in bed, Call bell within reach, and Caregiver / family present    COMMUNICATION/COLLABORATION:   The patients plan of care was discussed with: Physical therapist, Occupational therapist, and Registered nurse.      Maylin Soto, PT, DPT   Time Calculation: 28 mins

## 2021-07-28 NOTE — OP NOTES
[address]   OPERATIVE REPORT    Name:  Ajay Valero  MR#:  [id number]  :  1955  ACCOUNT #:  [de-identified]  DATE OF SERVICE:  2021      PREOPERATIVE DIAGNOSIS:  Osteoarthritis, left knee. POSTOPERATIVE DIAGNOSIS:  Osteoarthritis, left knee. PROCEDURE PERFORMED:  Left total knee arthroplasty. SURGEON:  Asha Hernandez MD    FIRST ASSISTANT:  Tanya Freeman PA-C    ANESTHESIA:  Spinal with sedation as well as adductor canal block. COMPLICATIONS:  None. SPECIMENS REMOVED:  None. IMPLANTS:  Components implanted, DonJoy Empowr 3-D size 8 femur, size 8 tibial tray with 14-mm polyethylene insert and 35-mm patella. ESTIMATED BLOOD LOSS:  250 mL. INDICATIONS:  The patient is a 39-year-old female with progressive left knee pain due to severe osteoarthritis. Symptoms have progressed despite comprehensive conservative treatment and she presents for left total knee replacement. Risks, benefits, alternatives of procedure reviewed with her in detail and she desires to proceed. PROCEDURE IN DETAIL:  Anesthesia Team placed an adductor canal block in the left thigh before taking the patient to the operating room and also placed a spinal.  Preoperative IV antibiotics were administered. Padded pneumatic tourniquet was placed around left upper thigh. Left lower extremity was prepped and draped in the usual sterile fashion. Through a midline anterior knee incision, I performed a medial parapatellar arthrotomy. Progressive medial release was performed to facilitate exposure and soft tissue balance throughout the procedure. 10-mm distal femoral resection was performed using OrthAlign to navigate a neutral cut relative to the mechanical axis of the femur. PCL was excised. Proximal tibial resection was performed using an extramedullary alignment guide. Menisci were excised.   Extensive medial release was required to facilitate a symmetrical extension gap with a 14-mm spacer block. Knee was placed in 90 degrees of flexion and gap  was inserted. Soft tissue tension was applied and block was adjusted to produce a 14-mm flexion space. Femoral rotation was drilled and the femur was sized and prepared for size 8 component utilizing appropriate jig. Remaining posterior osteophytes removed from the femur and subperiosteal posterior capsular release was performed. Trials were inserted and with 14-mm insert in place, the knee had full extension to gravity. Flexion gap opened up a few millimeters on the medial side, but with the extensor mechanism clipped closed, the medial soft tissue sleeve was reefed anteriorly which helped closed the medial flexion gap. During trialing, the tibial tray was allowed to rotate to find its kinematic home and rotation was marked. Patella was prepared for 35-mm component and tracked centrally using no thumbs technique. Tourniquet was inflated to 275. Trials were removed and tibial preparation was completed. Bony surfaces were copiously irrigated by pulse lavage and dried before the real components were cemented into place using antibiotic-impregnated cement. Excess cement was removed and knee was reduced in full extension with the real insert in place during cement setup. Periarticular soft tissues were injected with solution containing 0.5% ropivacaine with epinephrine as well as clonidine and Toradol. Tourniquet was released. Wound was irrigated. Arthrotomy was closed with a combination of heavy Vicryl sutures and a running #2 Stratafix suture. Skin and subcutaneous layers were closed in layered fashion with Vicryl and a running Monocryl subcuticular stitch. The wound was dressed with Dermabond and Aquacel occlusive dressing as well as sterile compressive dressing. The patient's bladder was decompressed with straight catheterization before she was transported to the postanesthesia care unit in stable condition.   All counts were correct at the end of the procedure. The physician assistant was critical throughout the case to assist with positioning, retraction, and closure. There were no other available residents, fellows, or surgical assistants available to assist during this procedure.         Hue Anand MD      [id number]  D:  07/28/2021 7:41  T:  07/28/2021 8:45  JOB #:  [id number]  CC:  MD Asha Madison MD

## 2021-07-28 NOTE — PROGRESS NOTES
DAJA: Plan for discharge home with [place]. [facility] has accepted patient. Patient's daughter Michelle Escalona will be staying with her. Patient owns RW. Family to transport patient home via car at discharge. Care Management Interventions  PCP Verified by CM: Yes  Mode of Transport at Discharge: Other (see comment) (family/car)  Transition of Care Consult (CM Consult): Home Health, Discharge [address]: No  Reason Outside [place]: Physician referred to specific agency  MyChart Signup: No  Discharge Durable Medical Equipment: No  Physical Therapy Consult: Yes  Occupational Therapy Consult: Yes  Speech Therapy Consult: No  Current Support Network: Own Home, Lives Alone, Family Lives Nearby  Confirm Follow Up Transport: Family  The Patient and/or Patient Representative was Provided with a Choice of Provider and Agrees with the Discharge Plan?: Yes  Freedom of Choice List was Provided with Basic Dialogue that Supports the Patient's Individualized Plan of Care/Goals, Treatment Preferences and Shares the Quality Data Associated with the Providers?: Yes  Discharge Location  Discharge Placement: Home with home health    Reason for Admission:  LEFT TOTAL KNEE ARTHROPLASTY                     RUR Score:    N/A (outpatient)                 Plan for utilizing home health: The Plan for Transition of Care is related to the following treatment goals: home health    The Patient and/or patient representative  was provided with a choice of provider and agrees   with the discharge plan. [x] Yes [] No    Freedom of choice list was provided with basic dialogue that supports the patient's individualized plan of care/goals, treatment preferences and shares the quality data associated with the providers.  [x] Yes [] No      PCP: First and Last name:  Kendal Servin MD     Name of Practice:    Are you a current patient: Yes/No: yes   Approximate date of last visit: July 6   Can you participate in a virtual visit with your PCP:                     Current Advanced Directive/Advance Care Plan: Full Code      Healthcare Decision Maker:   Click here to complete [address] including selection of the Healthcare Decision Maker Relationship (ie \"Primary\")                             Transition of Care Plan:   Home with [facility]                     Observation notice provided in writing to patient and/or caregiver as well as verbal explanation of the policy. Patients who are in outpatient status also receive the Observation notice. Patient has received notice and or patient representative has received via secure email, fax, or certified mail based on patient representative's preference.          Dore Aschoff, BSW/CRM

## 2021-07-28 NOTE — PROGRESS NOTES
Im not sure why this was sent to me. I guess because she was a former pt of Dr. Vanessa Proctor but she hasn't establish care with anyone else here. Can you review?

## 2021-07-28 NOTE — PROGRESS NOTES
Bedside shift change report given to Mecca Sanchez RN (oncoming nurse) by Melissa Machuca RN (offgoing nurse). Report included the following information SBAR, Kardex, Output.

## 2021-07-28 NOTE — PROGRESS NOTES
Late entry for AM exam this morning. Pain well controlled. Denies cp/sob.  No n/v.    VSS  Alert  L knee dressing dry  Calves soft NT  Motor 5/5  Pulses symmetrical    Recent Results (from the past 24 hour(s))   GLUCOSE, POC    Collection Time: 07/27/21  9:33 PM   Result Value Ref Range    Glucose (POC) 236 (H) 65 - 117 mg/dL    Performed by Sydnie pulliam RN    METABOLIC PANEL, BASIC    Collection Time: 07/28/21  3:15 AM   Result Value Ref Range    Sodium 137 136 - 145 mmol/L    Potassium 3.6 3.5 - 5.1 mmol/L    Chloride 103 97 - 108 mmol/L    CO2 28 21 - 32 mmol/L    Anion gap 6 5 - 15 mmol/L    Glucose 164 (H) 65 - 100 mg/dL    BUN 17 6 - 20 MG/DL    Creatinine 0.83 0.55 - 1.02 MG/DL    BUN/Creatinine ratio 20 12 - 20      GFR est AA >60 >60 ml/min/1.73m2    GFR est non-AA >60 >60 ml/min/1.73m2    Calcium 8.6 8.5 - 10.1 MG/DL   HEMOGLOBIN    Collection Time: 07/28/21  3:15 AM   Result Value Ref Range    HGB 9.7 (L) 11.5 - 16.0 g/dL   GLUCOSE, POC    Collection Time: 07/28/21  7:15 AM   Result Value Ref Range    Glucose (POC) 137 (H) 65 - 117 mg/dL    Performed by LEONORA AYALA        POD 1 L TKA; acute postop blood loss anemia (expected)  -PT  -pain control  -ASA  -anticipate disch home today    Mariaelena Knott MD

## 2021-07-29 ENCOUNTER — HOSPITAL ENCOUNTER (OUTPATIENT)
Dept: ULTRASOUND IMAGING | Age: 66
Discharge: HOME | End: 2021-07-29
Attending: ORTHOPAEDIC SURGERY
Payer: MEDICARE

## 2021-07-29 ENCOUNTER — TRANSCRIBE ORDER (OUTPATIENT)
Dept: SCHEDULING | Age: 66
End: 2021-07-29

## 2021-07-29 ENCOUNTER — PATIENT OUTREACH (OUTPATIENT)
Dept: CASE MANAGEMENT | Age: 66
End: 2021-07-29

## 2021-07-29 DIAGNOSIS — M79.89 SWELLING OF LIMB: Primary | ICD-10-CM

## 2021-07-29 DIAGNOSIS — M79.89 SWELLING OF LIMB: ICD-10-CM

## 2021-07-29 PROCEDURE — 93971 EXTREMITY STUDY: CPT

## 2021-07-29 NOTE — PROGRESS NOTES
Post Discharge Follow-up contact after Joint Replacement    Patient discharged on 7/28/21  By  Marleen Lynch   following  left knee Arthroplasty. Spoke with patient today, who reports they are \" feeling half way human after I got my shower. \"  Denies Fever, Shortness of Breath or Chest Pain. Home Health has visited. Patient also reports:  Surgical dressing  clean, dry, intact  Calf is non-tender, operative extremity has moderate swelling and bruising. Pain is well managed. Discussed use of ice & elevation. Patient is progressing with therapy and is exercising independently. Taking Aspirin for anticoagulation, oxycodone for pain. Patient is not experiencing symptoms of constipation & urinating without difficulty. Discussed side effects of anticoagulants & pain medications (bleeding/bruising, constipation, lightheaded/dizziness)  Follow up appointment is scheduled for 8/20. Discussed calling surgeon Dr Lexus Cee  for drainage, bleeding, swelling in operative extremity, fever or pain. Discussed calling PCP Dr Jd Mariee with other medical issues.

## 2021-07-29 NOTE — NURSE NAVIGATOR
[address]  [facility] Orthopaedic Pathway Handoff     FROM:                                TO: At [address]                                                      ([address] [place] [address] or Facility name)  Gerri Weston [address]  [address] [place] [address]  Dept: [address]: [phone]                                      Room#:  566/01                                                       Nurse Navigator:  Noris Tolbert RN         SITUATION      ASAScore: ASA 3 - Patient with moderate systemic disease with functional limitations    Admitted:  7/27/2021  Hospital Day: 2      Attending Provider:  No att. providers found     Consultations:  None    PCP:  Christa Orr MD   None     Admitting Dx:  Osteoarthritis of left knee [M17.12]       Active Problems:    Osteoarthritis of left knee (7/27/2021)      2 Days Post-Op of   Procedure(s):  LEFT TOTAL KNEE ARTHROPLASTY (ANES SPINAL WITH IV SEDATION)   BY: Ryanne Mitchell MD             ON: 7/27/2021                  Code Status: Prior             Advance Directive? Yes Not W Pt (Send w/patient)     Isolation:  There are currently no Active Isolations       MDRO: No current active infections    BACKGROUND     Allergies:   Allergies   Allergen Reactions    Sulfa (Sulfonamide Antibiotics) Hives    Tape [Adhesive] Other (comments)     RASH AND TAPE BURNS    Wellbutrin [Bupropion Hcl] Other (comments)     Hypertensive crisis       Past Medical History:   Diagnosis Date    Acne     Anemia     Arrhythmia     murmur    Asthma     Cancer ([facility] [address]     malignant melanoma of skin on back    DDD (degenerative disc disease), lumbar     Heart palpitations     Hypercholesterolemia     Hypertension     Hypokalemia     Insomnia     Nausea & vomiting     Osteoarthrosis, unspecified whether generalized or localized, unspecified site 3/16/2011    Polycystic ovaries     Seasonal allergies     Type II diabetes mellitus with complication, uncontrolled ([facility] [address] 9/18/2020    Vitamin D deficiency        Past Surgical History:   Procedure Laterality Date    HX COLONOSCOPY  2010    HX DILATION AND CURETTAGE  1986    HX HYSTERECTOMY  1986    parital    HX OOPHORECTOMY Right 1986    HX TONSILLECTOMY      HX TUBAL LIGATION  1978       Prior to Admission Medications   Prescriptions Last Dose Informant Patient Reported? Taking? Blood-Glucose Meter (OneTouch Ultra2 Meter) misc 7/26/2021 at Unknown time  Yes Yes   Sig: by Does Not Apply route. Inhalational Spacing Device (POCKET CHAMBER) Not Taking at Unknown time  Yes No   Sig: by Does Not Apply route as needed. Patient not taking: Reported on 7/27/2021   Janumet 50-1,000 mg per tablet 7/26/2021  No No   Sig: TAKE 1 TABLET TWICE A DAY   albuterol (PROVENTIL HFA, VENTOLIN HFA, PROAIR HFA) 90 mcg/actuation inhaler Not Taking at Unknown time  No No   Sig: Take 2 Puffs by inhalation every four (4) hours as needed for Wheezing. Indications: asthma attack   Patient not taking: Reported on 7/27/2021   albuterol (PROVENTIL, VENTOLIN) 90 mcg/Actuation inhaler   No No   Sig: Take 2 Puffs by inhalation every four (4) hours as needed. aspirin delayed-release (ASPIR-81) 81 mg tablet 7/23/2021  Yes No   Sig: Take  by mouth daily. chlorthalidone (HYGROTON) 25 mg tablet 7/26/2021 at Unknown time  No Yes   Sig: TAKE ONE TABLET BY MOUTH DAILY   cholecalciferol, vitamin D3, (VITAMIN D-3) 2,000 unit Tab 7/21/2021  Yes No   Sig: Take  by mouth.   clobetasol (TEMOVATE) 0.05 % ointment 7/20/2021 at Unknown time  Yes Yes   Sig: Apply  to affected area two (2) times a day.   ezetimibe (ZETIA) 10 mg tablet 7/26/2021 at Unknown time  Yes Yes   Sig: Take 10 mg by mouth every morning. fluocinoNIDE (LIDEX) 0.05 % topical cream 7/20/2021 at Unknown time  Yes Yes   Sig: Apply  to affected area two (2) times a day.    fluoride, sodium, (Denta 5000 Plus) 1.1 % crea 7/26/2021 at Unknown time  Yes Yes   Sig: by Dental route. fluticasone propion-salmeteroL (Advair Diskus) 250-50 mcg/dose diskus inhaler 2021 at Unknown time  No Yes   Sig: Take 1 Puff by inhalation every twelve (12) hours. fluticasone propionate (Flonase Allergy Relief) 50 mcg/actuation nasal spray Not Taking at Unknown time  Yes No   Si Sprays by Both Nostrils route daily. Patient not taking: Reported on 2021   glucose blood VI test strips (OneTouch Ultra Blue Test Strip) strip 2021 at Unknown time  Yes Yes   Sig: by Does Not Apply route See Admin Instructions. lancets (One Touch Delica) 33 gauge [facility]  at Unknown time  Yes Yes   Sig: by Does Not Apply route. montelukast (SINGULAIR) 10 mg tablet Not Taking at Unknown time  Yes No   Sig: Take 10 mg by mouth nightly as needed. Patient not taking: Reported on 2021   naproxen (NAPROSYN) 250 mg tablet 2021  No No   Sig: Take 2 Tabs by mouth two (2) times daily (with meals). rosuvastatin (CRESTOR) 5 mg tablet 2021 at Unknown time  No Yes   Sig: Take 1 Tab by mouth nightly. thiamine (VITAMIN B-1) 100 mg tablet 2021  Yes No   Sig: Take  by mouth daily. vit C/E/Zn/coppr/lutein/zeaxan (PRESERVISION AREDS-2 PO) 2021  Yes No   Sig: Take  by mouth. Facility-Administered Medications: None       Vaccinations:    Immunization History   Administered Date(s) Administered    Covid-19, MODERNA, Mrna, Lnp-s, Pf, 100mcg/0.5mL 03/10/2021, 2021    Hep B Vaccine 1986, 1987    Influenza Vaccine 2019    Influenza Vaccine (Quad) PF (>6 Mo Flulaval, Fluarix, and >3 Yrs Afluria, Fluzone 23240) 2019    Pneumococcal Conjugate (PCV-13) 2019    Pneumococcal Polysaccharide (PPSV-23) 2016    TB Skin Test (PPD) Intradermal 09/10/2014    Td 2011    Tdap 09/10/2014    Zoster Vaccine, Live 2015         ASSESSMENT   Age: 77 y.o. Gender: female        Height:                      Weight:      No data found. Active Orders   There are no active orders of the following types: Diet. Orientation:      Active Lines/Drains:  (Peg Tube / Desir / CL or S/L?):no    Urinary Status: Voiding      Last BM: Last Bowel Movement Date: 07/28/21     Skin Integrity: Incision (comment)             Mobility: Slightly limited   Weight Bearing Status: WBAT (Weight Bearing as Tolerated)      Gait Training  Assistive Device: Gait belt, Walker, rolling  Ambulation - Level of Assistance: Stand-by assistance  Distance (ft): 125 Feet (ft)  Stairs - Level of Assistance: Contact guard assistance  Number of Stairs Trained: 8  Rail Use: Both  Interventions: Safety awareness training, Tactile cues, Verbal cues, Visual/Demos     On Anticoagulation? YES  Aspirin                                         Pain Medications given:  Oxycodone                                    Lab Results   Component Value Date/Time    Glucose 164 (H) 07/28/2021 03:15 AM    Hemoglobin A1c 5.6 10/01/2020 08:17 AM    INR 1.0 07/21/2021 08:59 AM    HGB 9.7 (L) 07/28/2021 03:15 AM    HGB 12.4 10/01/2020 08:17 AM    HGB 12.6 03/16/2011 11:05 AM    Hemoglobin A1c, External 5.4 04/03/2020 12:00 AM       Readmission Risks:  Score:         RECOMMENDATION     See After Visit Summary (AVS) for:  · Discharge instructions  · After [address]   · Medication Reconciliation          [facility] Orthopaedic Nurse Navigator  DANNY Everett, RN-BC       Office  [phone]  Cell      [phone]  Fax      [phone]  [email or web address]             . [place]

## 2021-12-08 ENCOUNTER — TRANSCRIBE ORDER (OUTPATIENT)
Dept: REGISTRATION | Age: 66
End: 2021-12-08

## 2021-12-08 DIAGNOSIS — Z01.812 PRE-PROCEDURE LAB EXAM: Primary | ICD-10-CM

## 2021-12-17 ENCOUNTER — HOSPITAL ENCOUNTER (OUTPATIENT)
Dept: PREADMISSION TESTING | Age: 66
Discharge: HOME | End: 2021-12-17
Payer: MEDICARE

## 2021-12-17 ENCOUNTER — OFFICE VISIT (OUTPATIENT)
Dept: ORTHOPEDIC SURGERY | Age: 66
End: 2021-12-17
Payer: MEDICARE

## 2021-12-17 VITALS — BODY MASS INDEX: 40.04 KG/M2 | HEIGHT: 63 IN | WEIGHT: 226 LBS

## 2021-12-17 VITALS
HEIGHT: 63 IN | HEART RATE: 75 BPM | WEIGHT: 227.29 LBS | TEMPERATURE: 98.2 F | RESPIRATION RATE: 18 BRPM | BODY MASS INDEX: 40.27 KG/M2

## 2021-12-17 DIAGNOSIS — M17.11 PRIMARY OSTEOARTHRITIS OF RIGHT KNEE: Primary | ICD-10-CM

## 2021-12-17 LAB
ABO + RH BLD: NORMAL
APPEARANCE UR: CLEAR
BACTERIA URNS QL MICRO: NEGATIVE /HPF
BILIRUB UR QL: NEGATIVE
BLOOD GROUP ANTIBODIES SERPL: NORMAL
COLOR UR: ABNORMAL
EPITH CASTS URNS QL MICRO: ABNORMAL /LPF
GLUCOSE UR STRIP.AUTO-MCNC: NEGATIVE MG/DL
HGB UR QL STRIP: NEGATIVE
HYALINE CASTS URNS QL MICRO: ABNORMAL /LPF (ref 0–5)
KETONES UR QL STRIP.AUTO: NEGATIVE MG/DL
LEUKOCYTE ESTERASE UR QL STRIP.AUTO: ABNORMAL
NITRITE UR QL STRIP.AUTO: NEGATIVE
PH UR STRIP: 5.5 [PH] (ref 5–8)
PROT UR STRIP-MCNC: NEGATIVE MG/DL
RBC #/AREA URNS HPF: ABNORMAL /HPF (ref 0–5)
SP GR UR REFRACTOMETRY: 1.01 (ref 1–1.03)
SPECIMEN EXP DATE BLD: NORMAL
UA: UC IF INDICATED,UAUC: ABNORMAL
UROBILINOGEN UR QL STRIP.AUTO: 1 EU/DL (ref 0.2–1)
WBC URNS QL MICRO: ABNORMAL /HPF (ref 0–4)

## 2021-12-17 PROCEDURE — G9899 SCRN MAM PERF RSLTS DOC: HCPCS | Performed by: ORTHOPAEDIC SURGERY

## 2021-12-17 PROCEDURE — G8536 NO DOC ELDER MAL SCRN: HCPCS | Performed by: ORTHOPAEDIC SURGERY

## 2021-12-17 PROCEDURE — 1090F PRES/ABSN URINE INCON ASSESS: CPT | Performed by: ORTHOPAEDIC SURGERY

## 2021-12-17 PROCEDURE — G8432 DEP SCR NOT DOC, RNG: HCPCS | Performed by: ORTHOPAEDIC SURGERY

## 2021-12-17 PROCEDURE — G8399 PT W/DXA RESULTS DOCUMENT: HCPCS | Performed by: ORTHOPAEDIC SURGERY

## 2021-12-17 PROCEDURE — G8756 NO BP MEASURE DOC: HCPCS | Performed by: ORTHOPAEDIC SURGERY

## 2021-12-17 PROCEDURE — G8417 CALC BMI ABV UP PARAM F/U: HCPCS | Performed by: ORTHOPAEDIC SURGERY

## 2021-12-17 PROCEDURE — 1101F PT FALLS ASSESS-DOCD LE1/YR: CPT | Performed by: ORTHOPAEDIC SURGERY

## 2021-12-17 PROCEDURE — 86901 BLOOD TYPING SEROLOGIC RH(D): CPT

## 2021-12-17 PROCEDURE — 81001 URINALYSIS AUTO W/SCOPE: CPT

## 2021-12-17 PROCEDURE — 99214 OFFICE O/P EST MOD 30 MIN: CPT | Performed by: ORTHOPAEDIC SURGERY

## 2021-12-17 PROCEDURE — 3017F COLORECTAL CA SCREEN DOC REV: CPT | Performed by: ORTHOPAEDIC SURGERY

## 2021-12-17 PROCEDURE — G8427 DOCREV CUR MEDS BY ELIG CLIN: HCPCS | Performed by: ORTHOPAEDIC SURGERY

## 2021-12-17 PROCEDURE — 36415 COLL VENOUS BLD VENIPUNCTURE: CPT

## 2021-12-17 RX ORDER — ALBUTEROL SULFATE 2.5 MG/.5ML
SOLUTION RESPIRATORY (INHALATION)
COMMUNITY

## 2021-12-17 RX ORDER — CETIRIZINE HCL 10 MG
10 TABLET ORAL DAILY
COMMUNITY

## 2021-12-17 RX ORDER — MULTIVIT WITH MINERALS/HERBS
1 TABLET ORAL DAILY
COMMUNITY

## 2021-12-17 NOTE — PERIOP NOTES
[facility] INSTRUCTIONS  ORTHOPAEDIC    Surgery Date:   12/28/21    Surgery arrival time given by surgeon: NO     If no, [facility]'s staff will call you between 4 PM- 8 PM the day before surgery with your arrival time. If your surgery is on a Monday, we will call you the preceding Friday. Please call [phone] after 8 PM if you did not receive your arrival time. 1. Please report to [facility] Patient Access/Admitting on the 1st floor. Bring your insurance card, photo identification, and any copayment ( if applicable). 2. If you are going home the same day of your surgery, you must have a responsible adult to drive you home. You need to have a responsible adult to stay with you the first 24 hours after surgery and you should not drive a car for 24 hours following your surgery. 3. Do NOT eat any solid foods after midnight the night before surgery including candy, mints or gum. You may drink clear liquids from midnight until 1 hour prior to arrival time. You may drink up to 12 ounces at one time every 4 hours. 4. Do NOT drink alcohol or smoke 24 hours before surgery. STOP smoking for 14 days prior as it helps with breathing and healing after surgery. 5. If your arrival time is 3pm or later, you may eat a light breakfast before 8am (toast, bagel-no butter, black coffee, plain tea, fruit juice-no pulp) Please note special instructions, if applicable, below for medications. 6. If you are being admitted to the hospital,please leave personal belongings/luggage in your car until you have an assigned hospital room number. 7. Please wear comfortable clothes. Wear your glasses instead of contacts. We ask that all money, jewelry and valuables be left at home. Wear no make up, particularly mascara, the day of surgery. 8.  All body piercings, rings, and jewelry need to be removed and left at home. Please wear your hair loose or down. Please no pony-tails, buns, or any metal hair accessories.  If you shower the morning of surgery, please do not apply any lotions or powders afterwards. You may wear deodorant. Do not shave any body area within 24 hours of your surgery. 9. Please follow all instructions to avoid any potential surgical cancellation. 10. Should your physical condition change, (i.e. fever, cold, flu, etc.) please notify your surgeon as soon as possible. 11. It is important to be on time. If a situation occurs where you may be delayed, please call:  [phone] / [phone] on the day of surgery. 12. The Preadmission Testing staff can be reached at [phone]. 13. Special instructions: [employer]  14. Current Outpatient Medications   Medication Sig    albuterol sulfate (PROVENTIL;VENTOLIN) 2.5 mg/0.5 mL nebu nebulizer solution by Nebulization route every four (4) hours as needed for Wheezing.  b complex vitamins tablet Take 1 Tablet by mouth daily.  cetirizine (ZyrTEC) 10 mg tablet Take 10 mg by mouth daily.  aspirin delayed-release 81 mg tablet Take 1 Tablet by mouth two (2) times a day. Indications: blood clot prevention    acetaminophen (TYLENOL) 500 mg tablet Take 1 Tablet by mouth every four (4) hours.  Janumet 50-1,000 mg per tablet TAKE 1 TABLET TWICE A DAY (Patient taking differently: 0.5 Tablets two (2) times daily (with meals). )    fluticasone propion-salmeteroL (Advair Diskus) 250-50 mcg/dose diskus inhaler Take 1 Puff by inhalation every twelve (12) hours.  albuterol (PROVENTIL HFA, VENTOLIN HFA, PROAIR HFA) 90 mcg/actuation inhaler Take 2 Puffs by inhalation every four (4) hours as needed for Wheezing. Indications: asthma attack    chlorthalidone (HYGROTON) 25 mg tablet TAKE ONE TABLET BY MOUTH DAILY    vit C/E/Zn/coppr/lutein/zeaxan (PRESERVISION AREDS-2 PO) Take 1 Tablet by mouth two (2) times a day.  rosuvastatin (CRESTOR) 5 mg tablet Take 1 Tab by mouth nightly.     naproxen (NAPROSYN) 250 mg tablet Take 2 Tabs by mouth two (2) times daily (with meals).  fluoride, sodium, (Denta 5000 Plus) 1.1 % crea by Dental route.  fluticasone propionate (Flonase Allergy Relief) 50 mcg/actuation nasal spray 2 Sprays by Both Nostrils route daily.  lancets (One Touch Delica) 33 gauge misc by Does Not Apply route.  glucose blood VI test strips (OneTouch Ultra Blue Test Strip) strip by Does Not Apply route See Admin Instructions.  Blood-Glucose Meter (OneTouch Ultra2 Meter) misc by Does Not Apply route.  cholecalciferol, vitamin D3, (VITAMIN D-3) 2,000 unit Tab Take  by mouth.  fluocinoNIDE (LIDEX) 0.05 % topical cream Apply  to affected area two (2) times a day. (Patient not taking: Reported on 12/17/2021)    Inhalational Spacing Device (POCKET CHAMBER) by Does Not Apply route as needed. (Patient not taking: Reported on 7/27/2021)    clobetasol (TEMOVATE) 0.05 % ointment Apply  to affected area two (2) times a day. (Patient not taking: Reported on 12/17/2021)     No current facility-administered medications for this encounter. 1. YOU MUST ONLY TAKE THESE MEDICATIONS THE MORNING OF SURGERY WITH A SIP OF WATER: ZYRTEC, ADVAIR  2. MEDICATIONS TO TAKE THE MORNING OF SURGERY ONLY IF NEEDED: ALBUTEROL-BRING INHALER TO HOSPITAL, ALSO MAY USE NEBULIZER   3. HOLD these medications BEFORE Surgery: JANUMET-STOP 12/27/21, ASPIRIN,   4. Ask your surgeon/prescribing physician about when/if to STOP taking these medications: NONE  5. Stop any non-steroidal anti-inflammatory drugs (i.e. Ibuprofen, Naproxen, Advil, Aleve) as directed by your surgeon. You may take Tylenol. STOP all vitamins and herbal supplements 1 week prior to  surgery. 6. If you are currently taking Plavix, Coumadin, or any other blood-thinning/anticoagulant medication contact your prescribing physician for instructions. Preventing Infections Before and After  Your Surgery    IMPORTANT INSTRUCTIONS    Please read and follow these instructions carefully.  If you are unable to comply with the below instructions your procedure will be cancelled. You play an important role in your health and preparation for surgery. To reduce the germs on your skin you will need to shower with CHG soap (Chorhexidine gluconate 4%) two times before surgery. CHG soap (Hibiclens, Hex-A-Clens or store brand)   CHG soap will be provided at your Preadmission Testing (PAT) appointment.  If you do not have a PAT appointment before surgery, you may arrange to  CHG soap from our office or purchase CHG soap at a pharmacy, grocery or department store.  You need to purchase TWO 4 ounce bottles to use for your 2 showers. Steps to follow:  1. Wash your hair with your normal shampoo and your body with regular soap and rinse well to remove shampoo and soap from your skin. 2. Wet a clean washcloth and turn off the shower. 3. Put CHG soap on washcloth and apply to your entire body from the neck down. Do not use on your head, face or private parts(genitals). Do not use CHG soap on open sores, wounds or areas of skin irritation. 4. Wash you body gently for 5 minutes. Do not wash your skin too hard. This soap does not create lather. Pay special attention to your underarms and from your belly button to your feet. 5. Turn the shower back on and rinse well to get CHG soap off your body. 6. Pat your skin dry with a clean, dry towel. Do not apply lotions or moisturizer. 7. Put on clean clothes and sleep on fresh bed sheets and do not allow pets to sleep with you. Shower with CHG soap 2 times before your surgery   The evening before your surgery   The morning of your surgery      Tips to help prevent infections after your surgery:  1. Protect your surgical wound from germs:  ? Hand washing is the most important thing you and your caregivers can do to prevent infections. ? Keep your bandage clean and dry! ? Do not touch your surgical wound.   2. Use clean, freshly washed towels and washcloths every time you shower; do not share bath linens with others. 3. Until your surgical wound is healed, wear clothing and sleep on bed linens each day that are clean and freshly washed. 4. Do not allow pets to sleep in your bed with you or touch your surgical wound. 5. Do not smoke  smoking delays wound healing. This may be a good time to stop smoking. 6. If you have diabetes, it is important for you to manage your blood sugar levels properly before your surgery as well as after your surgery. Poorly managed blood sugar levels slow down wound healing and prevent you from healing completely. Prevention of Infection  Testing for Staphylococcus aureus on your skin before surgery    Staphylococcus aureus (staph) is a common bacteria that is found on the body. It normally does not cause infection on healthy skin. Before surgery, you will be tested to see if you have staph by swabbing the inside of your nose. When you have an incision with surgery, the goal is to protect that incision from infection. Removal of the staph bacteria before surgery can decrease the risk of a surgical site infection. If your nose swab is positive for staph you will be called. Your treatment will include 2 steps:   Prescription for Mupirocin ointment to be used in each nostril twice a day for 5 days.  Showering with Chlorhexidine (CHG) liquid soap for 5 days prior to surgery. How to use Mupirocin ointment in your nose  1.  the prescription from your pharmacy. You will receive a large tube of ointment which will be big enough for all of your treatments. You will apply this ointment to each nostril 2 times a day for 5 days. 2. Wash your hands with  gel or soap and water for 20 seconds before using ointment. 3. Place a pea-sized amount of ointment on a cotton Q-tip. 4. Apply ointment just inside of each nostril with the Q-tip. Do not push Q-tip or ointment deep inside you nose.   5. Press your nostrils together and massage for a few seconds. 6. Wash your hands with  gel or soap and water after you are finished. 7. Do not get ointment near your eyes. If it gets into your eyes, rinse them with cool water. 8. If you need to use nasal spray, clean the tip of the bottle with alcohol before use and do not use both at the same time. 9. If you are scheduled for COVID testing during the 5 days, do NOT apply morning dose until after the COVID test has been performed. How to use Chlorhexidine (CHG) 4% liquid soap  1. Purchase an 8 ounce bottle of CHG liquid soap (Chlorhexidine 4%, Hibiclens, Hex-A-Clens or store brand) at a pharmacy or grocery store. 2. Wash your hair with your normal shampoo and your body with regular soap and rinse well to remove shampoo and soap from your skin. 3. Wet a clean washcloth and turn off the shower. 4. Put CHG soap on washcloth and apply to your entire body from the neck down. Do not use on your head, face or private parts(genitals). Do not use CHG soap on open sores, wounds or areas of skin irritation. 5. Wash your body gently for 5 minutes. Do not wash your skin too hard. This soap does not create lather. Pay special attention to your underarms and from your belly button to your feet. 6. Turn the shower back on and rinse well to get CHG soap off your body. 7. Pat your skin dry with a clean, dry towel. Do not apply lotions or moisturizer. 8. Put on clean clothes and sleep on fresh bed sheets the night before surgery. Do not allow pets to sleep with you. Eating and Drinking Before Surgery     You may eat a regular dinner at the usual time on the day before your surgery.  Do NOT eat any solid foods after midnight unless your arrival time at the hospital is 3pm or later.  You may drink clear liquids only from 12 midnight until 1 hours prior to your arrival time at the hospital on the day of your surgery. Do NOT drink alcohol.    Clear liquids include:  o Water  o Fruit juices without pulp( i.e. apple juice)  o Carbonated beverages  o Black coffee (no cream/milk)  o Tea (no cream/milk)  o Gatorade   You may drink up to 12-16 ounces at one time every 4 hours between the hours of midnight and 1 hour before your arrival time at the hospital. Example- if your arrival time at the hospital is 6am, you may drink 12-16 ounces of clear liquids no later than 5am.   If your arrival time at the hospital is 3pm or later, you may eat a light breakfast before 8am.   A light breakfast includes:  o Toast or bagel (no butter)  o Black coffee (no cream/milk)  o Tea (no cream/milk)  o Fruit juices without pulp ( i.e. apple juice)  o Do NOT eat meat, eggs, vegetables or fruit   If you have any questions, please contact your surgeon's office. [facility]   Instructions for Pre-Surgery COVID-19 Testing     Across our ministry we have established standard guidelines to ensure the health and safety of our patients, residents and associates as we resume elective services for patients. All patients presenting for surgery are required to have a COVID-19 test result within 96 hours of their scheduled surgery. [facility] is providing this test free of charge to the patient.    Instructions for COVID-19 Testing:     Patients will receive a call from Pre-Admission Testing 4-5 days prior to surgery to schedule a date and time to come to the [address] [place] [address] Drive for their COVID-19 test   Patients are advised to self-quarantine after testing until their scheduled surgery   Once on site, patients will be registered and receive COVID test in their vehicle   If a patient is scheduled for normal Pre Admission Testing 96 hours from date of surgery, the patient will still have their COVID test done at the [address] [place] [address] located at [address] [place] [address] Positive results will be shared with the surgeon and anesthesiologist and may result in cancellation of the elective procedure    Testing Hours and Location:   Address:  [facility] Up Pre Admission [address] in the Discharge Lot on [address] (Map Attached)  [address] [facility], [address]   Hours: Monday- Friday 7a-3p    PAT Phone Number: [phone]            Patient Information Regarding COVID Restrictions    Patients are advised to self-quarantine after COVID testing up to the day of the scheduled procedure. Day of Procedure     Please park in the parking deck or any designated visitor parking lot.  Enter the facility through the Main Entrance of the hospital.   A temperature check and appropriate symptom/exposure screening will be done prior to entry to the facility.  On the day of surgery, please provide the cell phone number of the person who will be waiting for you to the Patient Access representative at the time of registration.  Please wear a mask on the day of your procedure.  We are now allowing one designated visitor per stay. Pediatric patients may have 2 designated visitors. This one person may come in with you on the day of your procedure.  No visitors under the age of 13.  The designated visitor must also wear a mask.  Once your procedure and the immediate recovery period is completed, a nurse in the recovery area will contact your designated visitor to inform them of your room number or to otherwise review other pertinent information regarding your care.  Social distancing practices are to be adhered to in waiting areas and the cafeteria. The patient was contacted  in person. She  verbalize  understanding of all instructions does not  need reinforcement.

## 2021-12-17 NOTE — Clinical Note
12/17/2021    Patient: Carley Stiles   YOB: 1955   Date of Visit: 12/17/2021     Peggy Dennison MD  Via     Dear Peggy Dennison MD,      Thank you for referring Ms. Evelyne Zavala to [facility] for evaluation. My notes for this consultation are attached. If you have questions, please do not hesitate to call me. I look forward to following your patient along with you.       Sincerely,    Severo Holly MD

## 2021-12-17 NOTE — PROGRESS NOTES
Luz Martinez (: 1955) is a 74 y.o. female, patient, here for evaluation of the following chief complaint(s):  Knee Pain (right knee, surgical consult)       SUBJECTIVE/OBJECTIVE:  Luz Martinez presents today for severe right knee osteoarthritis. She has had progressive symptoms in the right knee for many years. Worse since her left knee replacement earlier this year. She has severe pain at start up, pain with all weightbearing activities. She uses a cane full-time. Can walk very short distances. Cannot stand still in 1 position for more than a few minutes. Intermittent wakening night pain. Mechanical symptoms with episodes of giving way, no falls. She has had multiple corticosteroid injections with decreasing efficacy. Anti-inflammatories have not helped and caused some side effects. She is here in anticipation of moving forward with right total knee replacement. PHYSICAL EXAM:  Vitals: Ht 5' 3\" (1.6 m)   Wt 226 lb (102.5 kg)   BMI 40.03 kg/m²   Body mass index is 40.03 kg/m². 74y.o. year old F, no distress. Antalgic gait on the right side. Pain-free motion right hip. Negative Stinchfield. Right knee is in varus. No obvious effusion. Diffuse tenderness. Crepitus with motion, 5 to approximately 115 degrees. Patella tracks centrally. No instability. Symmetrical palpable distal pulses. No gross motor or sensory deficits in lower extremities. No distal edema. IMAGING:  Radiographs: XR Results (most recent):  Results from Appointment encounter on 21    XR KNEE RT MIN 4 V    Narrative  4 x-ray views of right knee including AP and PA flexion, lateral, sunrise demonstrate complete loss of medial joint space. Moderate to severe loss patellofemoral space. Tricompartmental osteophytes. Osteopenia. ASSESSMENT/PLAN:  1. Primary osteoarthritis of right knee  -     XR KNEE RT MIN 4 V; Future    The xray and exam findings were discussed with the patient today.   We discussed continued conservative treatment measures versus right total knee replacement. Symptoms have progressed despite comprehensive conservative treatment measures outlined above and patient desires to proceed with right total knee replacement. We discussed risks, benefits, and alternatives in detail, as well as anticipated hospital stay and course of rehabilitation. The patient will see their primary care physician prior to surgery. All questions answered. Plan to use IV tranexamic acid intraoperatively, aspirin for DVT prophylaxis. She has an adhesive allergy. We know from her past (Aquacel dressings do not cause a reaction. Return for surgery. Review Of Systems  ROS     Positive for: Musculoskeletal    Last edited by Ramana Dietrich RN on 12/17/2021  8:31 AM. (History)         Patient denies any recent fever, chills, nausea, vomiting, chest pain, or shortness of breath. Allergies   Allergen Reactions    Sulfa (Sulfonamide Antibiotics) Hives    Tape [Adhesive] Other (comments)     RASH AND TAPE BURNS    Wellbutrin [Bupropion Hcl] Other (comments)     Hypertensive crisis       Current Outpatient Medications   Medication Sig    aspirin delayed-release 81 mg tablet Take 1 Tablet by mouth two (2) times a day. Indications: blood clot prevention    senna-docusate (PERICOLACE) 8.6-50 mg per tablet Take 1 Tablet by mouth two (2) times daily as needed for Constipation.  acetaminophen (TYLENOL) 500 mg tablet Take 1 Tablet by mouth every four (4) hours.  Janumet 50-1,000 mg per tablet TAKE 1 TABLET TWICE A DAY (Patient taking differently: 0.5 Tablets two (2) times daily (with meals). )    fluticasone propion-salmeteroL (Advair Diskus) 250-50 mcg/dose diskus inhaler Take 1 Puff by inhalation every twelve (12) hours.  albuterol (PROVENTIL HFA, VENTOLIN HFA, PROAIR HFA) 90 mcg/actuation inhaler Take 2 Puffs by inhalation every four (4) hours as needed for Wheezing.  Indications: asthma attack (Patient not taking: Reported on 7/27/2021)    chlorthalidone (HYGROTON) 25 mg tablet TAKE ONE TABLET BY MOUTH DAILY    vit C/E/Zn/coppr/lutein/zeaxan (PRESERVISION AREDS-2 PO) Take  by mouth.  rosuvastatin (CRESTOR) 5 mg tablet Take 1 Tab by mouth nightly.  naproxen (NAPROSYN) 250 mg tablet Take 2 Tabs by mouth two (2) times daily (with meals).  fluoride, sodium, (Denta 5000 Plus) 1.1 % crea by Dental route.  ezetimibe (ZETIA) 10 mg tablet Take 10 mg by mouth every morning.  fluticasone propionate (Flonase Allergy Relief) 50 mcg/actuation nasal spray 2 Sprays by Both Nostrils route daily. (Patient not taking: Reported on 7/27/2021)    fluocinoNIDE (LIDEX) 0.05 % topical cream Apply  to affected area two (2) times a day.  montelukast (SINGULAIR) 10 mg tablet Take 10 mg by mouth nightly as needed. (Patient not taking: Reported on 7/27/2021)    lancets (One Touch Delica) 33 gauge misc by Does Not Apply route.  glucose blood VI test strips (OneTouch Ultra Blue Test Strip) strip by Does Not Apply route See Admin Instructions.  Blood-Glucose Meter (OneTouch Ultra2 Meter) misc by Does Not Apply route.  Inhalational Spacing Device (POCKET CHAMBER) by Does Not Apply route as needed. (Patient not taking: Reported on 7/27/2021)    albuterol (PROVENTIL, VENTOLIN) 90 mcg/Actuation inhaler Take 2 Puffs by inhalation every four (4) hours as needed.  clobetasol (TEMOVATE) 0.05 % ointment Apply  to affected area two (2) times a day.  thiamine (VITAMIN B-1) 100 mg tablet Take  by mouth daily.  cholecalciferol, vitamin D3, (VITAMIN D-3) 2,000 unit Tab Take  by mouth. No current facility-administered medications for this visit.        Past Medical History:   Diagnosis Date    Acne     Anemia     Arrhythmia     murmur    Asthma     Cancer ([address]     malignant melanoma of skin on back    DDD (degenerative disc disease), lumbar     Heart palpitations     Hypercholesterolemia     Hypertension     Hypokalemia     Insomnia     Nausea & vomiting     Osteoarthrosis, unspecified whether generalized or localized, unspecified site 3/16/2011    Polycystic ovaries     Seasonal allergies     Type II diabetes mellitus with complication, uncontrolled ([facility] 9/18/2020    Vitamin D deficiency         Past Surgical History:   Procedure Laterality Date    HX COLONOSCOPY  2010    HX DILATION AND CURETTAGE  1986    HX HYSTERECTOMY  1986    parital    HX OOPHORECTOMY Right 1986    HX TONSILLECTOMY      HX TUBAL LIGATION  1978       Family History   Problem Relation Age of Onset    Heart Disease Father     Diabetes Brother     Heart Disease Brother     Anesth Problems Neg Hx         Social History     Socioeconomic History    Marital status:      Spouse name: Not on file    Number of children: Not on file    Years of education: Not on file    Highest education level: Not on file   Occupational History    Not on file   Tobacco Use    Smoking status: Never Smoker    Smokeless tobacco: Never Used   Vaping Use    Vaping Use: Never used   Substance and Sexual Activity    Alcohol use: No    Drug use: Never    Sexual activity: Not Currently   Other Topics Concern    Not on file   Social History Narrative    Not on file     Social Determinants of Health     Financial Resource Strain:     Difficulty of Paying Living Expenses: Not on file   Food Insecurity:     Worried About Running Out of Food in the Last Year: Not on file    Bob of Food in the Last Year: Not on file   Transportation Needs:     Lack of Transportation (Medical): Not on file    Lack of Transportation (Non-Medical):  Not on file   Physical Activity:     Days of Exercise per Week: Not on file    Minutes of Exercise per Session: Not on file   Stress:     Feeling of Stress : Not on file   Social Connections:     Frequency of Communication with Friends and Family: Not on file    Frequency of Social Gatherings with Friends and Family: Not on file    Attends Confucianist Services: Not on file    Active Member of Clubs or Organizations: Not on file    Attends Club or Organization Meetings: Not on file    Marital Status: Not on file   Intimate Partner Violence:     Fear of Current or Ex-Partner: Not on file    Emotionally Abused: Not on file    Physically Abused: Not on file    Sexually Abused: Not on file   Housing Stability:     Unable to Pay for Housing in the Last Year: Not on file    Number of [place] in the Last Year: Not on file    Unstable Housing in the Last Year: Not on file       Orders Placed This Encounter    XR KNEE RT MIN 4 V     4a     Standing Status:   Future     Number of Occurrences:   1     Standing Expiration Date:   12/18/2022        An electronic signature was used to authenticate this note.   -- Marisa Jasso MD

## 2021-12-17 NOTE — PERIOP NOTES
LABS FROM PCP OFFICE PLACED ON CHART, EKG ON CHART    COPY OF COVID CARD ON CHART     INSTRUCTIONS AND 2 BOTTLES OF SOAP GIVEN AND REVIEWED PT GIVEN TIME TO ASK QUESTIONS

## 2021-12-18 LAB
BACTERIA SPEC CULT: NORMAL
BACTERIA SPEC CULT: NORMAL
SERVICE CMNT-IMP: NORMAL

## 2021-12-20 NOTE — PERIOP NOTES
PAT Nurse Practitioner   Pre-Operative Chart Review/Assessment:-ORTHOPEDIC                Patient Name:  Eliseo Salamanca                                                           Age:   74 y.o.    :  1955     Today's Date:  2021     Date of PAT:   2021      Date of Surgery:    2021      Procedure(s):  Right Total Knee Arthroplasty     Surgeon:   Dr. Eliu Colby:      1)  Medical Clearance:  Dr. Aneesh Moraes      2)  Cardiac Clearance:  EKG and METs reviewed. No further cardiac evaluation requested. 3)  Diabetic Treatment Consult:  Not indicated. A1c-6.1      4)  Sleep Apnea evaluation:   Not indicated.  JOHNNA Score 3.       5) Treatment for MRSA/Staph Aureus:  Neg      6) Additional Concerns:  PONV, HTN, Asthma, T2DM, obesity                Vital Signs:         Vitals:    21 1420   Pulse: 75   Resp: 18   Temp: 98.2 °F (36.8 °C)   Weight: 103.1 kg (227 lb 4.7 oz)   Height: 5' 3\" (1.6 m)            ____________________________________________  PAST MEDICAL HISTORY  Past Medical History:   Diagnosis Date    Acne     RESOLVED     Anemia     RESOLVED    Arrhythmia     murmur    Asthma     Cancer ([address]     malignant melanoma of skin on back    DDD (degenerative disc disease), lumbar     Heart palpitations     MURMUR    Hypercholesterolemia     Hypertension     Hypokalemia     RESOLVED    Insomnia     RESOLVED     Macular degeneration of left eye     WET SEEN BY RETINA SPECIALIST     Nausea & vomiting     Osteoarthrosis, unspecified whether generalized or localized, unspecified site 3/16/2011    Polycystic ovaries     Seasonal allergies     Type II diabetes mellitus with complication, uncontrolled ([address] 2020    Vitamin D deficiency     RESOLVED       ____________________________________________  PAST SURGICAL HISTORY  Past Surgical History:   Procedure Laterality Date    HX COLONOSCOPY      HX DILATION AND CURETTAGE      HX HYSTERECTOMY  1986    parital    HX KNEE REPLACEMENT Left 07/27/2021    HX OOPHORECTOMY Right 1986    HX OTHER SURGICAL      EXCISION OF MALNOMA     HX TONSILLECTOMY      HX TUBAL LIGATION  1978      ____________________________________________  HOME MEDICATIONS  Current Outpatient Medications   Medication Sig    albuterol sulfate (PROVENTIL;VENTOLIN) 2.5 mg/0.5 mL nebu nebulizer solution by Nebulization route every four (4) hours as needed for Wheezing.  b complex vitamins tablet Take 1 Tablet by mouth daily.  cetirizine (ZyrTEC) 10 mg tablet Take 10 mg by mouth daily.  aspirin delayed-release 81 mg tablet Take 1 Tablet by mouth two (2) times a day. Indications: blood clot prevention    acetaminophen (TYLENOL) 500 mg tablet Take 1 Tablet by mouth every four (4) hours.  Janumet 50-1,000 mg per tablet TAKE 1 TABLET TWICE A DAY (Patient taking differently: 0.5 Tablets two (2) times daily (with meals). )    fluticasone propion-salmeteroL (Advair Diskus) 250-50 mcg/dose diskus inhaler Take 1 Puff by inhalation every twelve (12) hours.  albuterol (PROVENTIL HFA, VENTOLIN HFA, PROAIR HFA) 90 mcg/actuation inhaler Take 2 Puffs by inhalation every four (4) hours as needed for Wheezing. Indications: asthma attack    chlorthalidone (HYGROTON) 25 mg tablet TAKE ONE TABLET BY MOUTH DAILY    vit C/E/Zn/coppr/lutein/zeaxan (PRESERVISION AREDS-2 PO) Take 1 Tablet by mouth two (2) times a day.  rosuvastatin (CRESTOR) 5 mg tablet Take 1 Tab by mouth nightly.  naproxen (NAPROSYN) 250 mg tablet Take 2 Tabs by mouth two (2) times daily (with meals).  fluoride, sodium, (Denta 5000 Plus) 1.1 % crea by Dental route.  fluticasone propionate (Flonase Allergy Relief) 50 mcg/actuation nasal spray 2 Sprays by Both Nostrils route daily.  lancets (One Touch Delica) 33 gauge misc by Does Not Apply route.     glucose blood VI test strips (OneTouch Ultra Blue Test Strip) strip by Does Not Apply route See Admin Instructions.  Blood-Glucose Meter (OneTouch Ultra2 Meter) misc by Does Not Apply route.  cholecalciferol, vitamin D3, (VITAMIN D-3) 2,000 unit Tab Take  by mouth.  fluocinoNIDE (LIDEX) 0.05 % topical cream Apply  to affected area two (2) times a day. (Patient not taking: Reported on 12/17/2021)    Inhalational Spacing Device (POCKET CHAMBER) by Does Not Apply route as needed. (Patient not taking: Reported on 7/27/2021)    clobetasol (TEMOVATE) 0.05 % ointment Apply  to affected area two (2) times a day.  (Patient not taking: Reported on 12/17/2021)     No current facility-administered medications for this encounter.      ____________________________________________  ALLERGIES  Allergies   Allergen Reactions    Sulfa (Sulfonamide Antibiotics) Hives    Tape [Adhesive] Other (comments)     RASH AND TAPE BURNS    Wellbutrin [Bupropion Hcl] Other (comments)     Hypertensive crisis      ____________________________________________  SOCIAL HISTORY  Social History     Tobacco Use    Smoking status: Never Smoker    Smokeless tobacco: Never Used   Substance Use Topics    Alcohol use: No      ____________________________________________   Internal Administration   First Dose COVID-19, Kendall Sax, Primary or Immunocompromised Series, MRNA, PF, 100mcg/0.5mL  03/10/2021   Second Dose COVID-19, Moderna, Primary or Immunocompromised Series, MRNA, PF, 100mcg/0.5mL  04/08/2021      Last COVID Lab No results found for: Mary Moore, RCV2CT, CVD2M, [place], XPLCVT, [address], [id number], COVV, Marla Phenes, [address] [facility] Outpatient Visit on 12/17/2021   Component Date Value Ref Range Status    Crossmatch Expiration 12/17/2021 12/31/2021,2359   Final    ABO/Rh(D) 12/17/2021 A POSITIVE   Final    Antibody screen 12/17/2021 NEG   Final    Special Requests: 12/17/2021 NO SPECIAL REQUESTS    Final    Culture result: 12/17/2021 MRSA NOT PRESENT    Final            Color 12/17/2021 DARK YELLOW    Final    Color Reference Range: Straw, Yellow or Dark Yellow    Appearance 12/17/2021 CLEAR  CLEAR   Final    Specific gravity 12/17/2021 1.013  1.003 - 1.030   Final    pH (UA) 12/17/2021 5.5  5.0 - 8.0   Final    Protein 12/17/2021 Negative  NEG mg/dL Final    Glucose 12/17/2021 Negative  NEG mg/dL Final    Ketone 12/17/2021 Negative  NEG mg/dL Final    Bilirubin 12/17/2021 Negative  NEG   Final    Blood 12/17/2021 Negative  NEG   Final    Urobilinogen 12/17/2021 1.0  0.2 - 1.0 EU/dL Final    Nitrites 12/17/2021 Negative  NEG   Final    Leukocyte Esterase 12/17/2021 TRACE* NEG   Final    UA:UC IF INDICATED 12/17/2021 CULTURE NOT INDICATED BY UA RESULT  CNI   Final    WBC 12/17/2021 0-4  0 - 4 /hpf Final    RBC 12/17/2021 0-5  0 - 5 /hpf Final    Epithelial cells 12/17/2021 FEW  FEW /lpf Final    Epithelial cell category consists of squamous cells and /or transitional urothelial cells. Renal tubular cells, if present, are separately identified as such.  Bacteria 12/17/2021 Negative  NEG /hpf Final    Hyaline cast 12/17/2021 0-2  0 - 5 /lpf Final     Ventricular Rate BPM 60    Atrial Rate BPM 60    P-R Interval ms 178    QRS Duration ms 88    Q-T Interval ms 430    QTC Calculation (Bezet) ms 430    Calculated P Axis degrees -3    Calculated R Axis degrees -14    Calculated T Axis degrees 15    Diagnosis  Normal sinus rhythm   Minimal voltage criteria for LVH, may be normal variant   Borderline ECG   No previous ECGs available   Confirmed by Shai Parham ([id number]) on 7/21/2021 9:58:43 AM              Skin:     Denies open wounds, cuts, sores, rashes or other areas of concern in PAT assessment.           Henrique Joy NP

## 2021-12-21 RX ORDER — ACETAMINOPHEN 500 MG
1000 TABLET ORAL ONCE
Status: CANCELLED | OUTPATIENT
Start: 2021-12-21 | End: 2021-12-21

## 2021-12-21 RX ORDER — PREGABALIN 75 MG/1
75 CAPSULE ORAL ONCE
Status: CANCELLED | OUTPATIENT
Start: 2021-12-21 | End: 2021-12-21

## 2021-12-23 ENCOUNTER — HOSPITAL ENCOUNTER (OUTPATIENT)
Dept: PREADMISSION TESTING | Age: 66
Discharge: HOME | End: 2021-12-23
Attending: ORTHOPAEDIC SURGERY
Payer: MEDICARE

## 2021-12-23 DIAGNOSIS — Z01.812 PRE-PROCEDURE LAB EXAM: ICD-10-CM

## 2021-12-23 PROCEDURE — U0005 INFEC AGEN DETEC AMPLI PROBE: HCPCS

## 2021-12-26 LAB
SARS-COV-2, XPLCVT: NOT DETECTED
SOURCE, COVRS: NORMAL

## 2021-12-27 ENCOUNTER — ANESTHESIA EVENT (OUTPATIENT)
Dept: SURGERY | Age: 66
End: 2021-12-27
Payer: MEDICARE

## 2021-12-28 ENCOUNTER — ANESTHESIA (OUTPATIENT)
Dept: SURGERY | Age: 66
End: 2021-12-28
Payer: MEDICARE

## 2021-12-28 ENCOUNTER — HOSPITAL ENCOUNTER (OUTPATIENT)
Age: 66
Discharge: HOME HEALTH | End: 2021-12-29
Attending: ORTHOPAEDIC SURGERY | Admitting: ORTHOPAEDIC SURGERY
Payer: MEDICARE

## 2021-12-28 DIAGNOSIS — M17.11 PRIMARY LOCALIZED OSTEOARTHRITIS OF RIGHT KNEE: Primary | ICD-10-CM

## 2021-12-28 LAB
GLUCOSE BLD STRIP.AUTO-MCNC: 106 MG/DL (ref 65–117)
GLUCOSE BLD STRIP.AUTO-MCNC: 117 MG/DL (ref 65–117)
GLUCOSE BLD STRIP.AUTO-MCNC: 150 MG/DL (ref 65–117)
GLUCOSE BLD STRIP.AUTO-MCNC: 152 MG/DL (ref 65–117)
SERVICE CMNT-IMP: ABNORMAL
SERVICE CMNT-IMP: ABNORMAL
SERVICE CMNT-IMP: NORMAL
SERVICE CMNT-IMP: NORMAL

## 2021-12-28 PROCEDURE — 82962 GLUCOSE BLOOD TEST: CPT

## 2021-12-28 PROCEDURE — 74011250637 HC RX REV CODE- 250/637

## 2021-12-28 PROCEDURE — 77030012935 HC DRSG AQUACEL BMS -B: Performed by: ORTHOPAEDIC SURGERY

## 2021-12-28 PROCEDURE — 77030040922 HC BLNKT HYPOTHRM STRY -A

## 2021-12-28 PROCEDURE — 27447 TOTAL KNEE ARTHROPLASTY: CPT | Performed by: PHYSICIAN ASSISTANT

## 2021-12-28 PROCEDURE — 74011000258 HC RX REV CODE- 258

## 2021-12-28 PROCEDURE — 77030006835 HC BLD SAW SAG STRY -B: Performed by: ORTHOPAEDIC SURGERY

## 2021-12-28 PROCEDURE — 74011250636 HC RX REV CODE- 250/636: Performed by: ANESTHESIOLOGY

## 2021-12-28 PROCEDURE — 77030000032 HC CUF TRNQT ZIMM -B: Performed by: ORTHOPAEDIC SURGERY

## 2021-12-28 PROCEDURE — 77030010507 HC ADH SKN DERMBND J&J -B: Performed by: ORTHOPAEDIC SURGERY

## 2021-12-28 PROCEDURE — 77030014077 HC TOWER MX CEM J&J -C: Performed by: ORTHOPAEDIC SURGERY

## 2021-12-28 PROCEDURE — 74011250636 HC RX REV CODE- 250/636: Performed by: PHYSICIAN ASSISTANT

## 2021-12-28 PROCEDURE — 77030020274 HC MISC IMPL ORTHOPEDIC: Performed by: ORTHOPAEDIC SURGERY

## 2021-12-28 PROCEDURE — 77030007866 HC KT SPN ANES BBMI -B: Performed by: ANESTHESIOLOGY

## 2021-12-28 PROCEDURE — 77030003601 HC NDL NRV BLK BBMI -A

## 2021-12-28 PROCEDURE — C1713 ANCHOR/SCREW BN/BN,TIS/BN: HCPCS | Performed by: ORTHOPAEDIC SURGERY

## 2021-12-28 PROCEDURE — 97530 THERAPEUTIC ACTIVITIES: CPT

## 2021-12-28 PROCEDURE — 76210000006 HC OR PH I REC 0.5 TO 1 HR: Performed by: ORTHOPAEDIC SURGERY

## 2021-12-28 PROCEDURE — 77030031139 HC SUT VCRL2 J&J -A: Performed by: ORTHOPAEDIC SURGERY

## 2021-12-28 PROCEDURE — 2709999900 HC NON-CHARGEABLE SUPPLY: Performed by: ORTHOPAEDIC SURGERY

## 2021-12-28 PROCEDURE — 77030002933 HC SUT MCRYL J&J -A: Performed by: ORTHOPAEDIC SURGERY

## 2021-12-28 PROCEDURE — 27447 TOTAL KNEE ARTHROPLASTY: CPT | Performed by: ORTHOPAEDIC SURGERY

## 2021-12-28 PROCEDURE — 74011250637 HC RX REV CODE- 250/637: Performed by: PHYSICIAN ASSISTANT

## 2021-12-28 PROCEDURE — 74011000250 HC RX REV CODE- 250

## 2021-12-28 PROCEDURE — 74011250636 HC RX REV CODE- 250/636

## 2021-12-28 PROCEDURE — 76060000037 HC ANESTHESIA 3 TO 3.5 HR: Performed by: ORTHOPAEDIC SURGERY

## 2021-12-28 PROCEDURE — C1776 JOINT DEVICE (IMPLANTABLE): HCPCS | Performed by: ORTHOPAEDIC SURGERY

## 2021-12-28 PROCEDURE — 74011000250 HC RX REV CODE- 250: Performed by: ANESTHESIOLOGY

## 2021-12-28 PROCEDURE — 77030033067 HC SUT PDO STRATFX SPIR J&J -B: Performed by: ORTHOPAEDIC SURGERY

## 2021-12-28 PROCEDURE — 77030039497 HC CST PAD STERILE CHCS -A: Performed by: ORTHOPAEDIC SURGERY

## 2021-12-28 PROCEDURE — 74011000250 HC RX REV CODE- 250: Performed by: PHYSICIAN ASSISTANT

## 2021-12-28 PROCEDURE — 76010000172 HC OR TIME 2.5 TO 3 HR INTENSV-TIER 1: Performed by: ORTHOPAEDIC SURGERY

## 2021-12-28 PROCEDURE — 77030040750 HC INSTR NAV SYS DISP ORLN -G: Performed by: ORTHOPAEDIC SURGERY

## 2021-12-28 PROCEDURE — 97161 PT EVAL LOW COMPLEX 20 MIN: CPT

## 2021-12-28 PROCEDURE — 74011000250 HC RX REV CODE- 250: Performed by: ORTHOPAEDIC SURGERY

## 2021-12-28 PROCEDURE — 97116 GAIT TRAINING THERAPY: CPT

## 2021-12-28 PROCEDURE — 77030005513 HC CATH URETH FOL11 MDII -B: Performed by: ORTHOPAEDIC SURGERY

## 2021-12-28 DEVICE — EMPOWR 3D KNEETM INS, 7R 12MM, VE
Type: IMPLANTABLE DEVICE | Site: KNEE | Status: FUNCTIONAL
Brand: DJO SURGICAL

## 2021-12-28 DEVICE — IMPL CAPPED KNEE K1 TOTAL/HEMI STD CEMENTED DJO: Type: IMPLANTABLE DEVICE | Status: FUNCTIONAL

## 2021-12-28 DEVICE — EMPOWR 3D KNEETM FEMUR, NP, 7R
Type: IMPLANTABLE DEVICE | Site: KNEE | Status: FUNCTIONAL
Brand: DJO SURGICAL

## 2021-12-28 DEVICE — DJO EMPOWR KNEETM, FIN BP, NP 7R
Type: IMPLANTABLE DEVICE | Site: KNEE | Status: FUNCTIONAL
Brand: DJO SURGICAL

## 2021-12-28 DEVICE — CEMENT BNE GENTAMICIN 40 GM SMARTSET GMV: Type: IMPLANTABLE DEVICE | Site: KNEE | Status: FUNCTIONAL

## 2021-12-28 DEVICE — DOMED TRI-PEG PATELLA, 35X9MM, E-PLUS
Type: IMPLANTABLE DEVICE | Site: KNEE | Status: FUNCTIONAL
Brand: DJO SURGICAL

## 2021-12-28 RX ORDER — SODIUM CHLORIDE 9 MG/ML
125 INJECTION, SOLUTION INTRAVENOUS CONTINUOUS
Status: DISCONTINUED | OUTPATIENT
Start: 2021-12-28 | End: 2021-12-29 | Stop reason: HOSPADM

## 2021-12-28 RX ORDER — POLYETHYLENE GLYCOL 3350 17 G/17G
17 POWDER, FOR SOLUTION ORAL DAILY
Status: DISCONTINUED | OUTPATIENT
Start: 2021-12-29 | End: 2021-12-29 | Stop reason: HOSPADM

## 2021-12-28 RX ORDER — SODIUM CHLORIDE, SODIUM LACTATE, POTASSIUM CHLORIDE, CALCIUM CHLORIDE 600; 310; 30; 20 MG/100ML; MG/100ML; MG/100ML; MG/100ML
1000 INJECTION, SOLUTION INTRAVENOUS CONTINUOUS
Status: DISCONTINUED | OUTPATIENT
Start: 2021-12-28 | End: 2021-12-28 | Stop reason: HOSPADM

## 2021-12-28 RX ORDER — OXYCODONE HYDROCHLORIDE 5 MG/1
5 TABLET ORAL
Status: DISCONTINUED | OUTPATIENT
Start: 2021-12-28 | End: 2021-12-28

## 2021-12-28 RX ORDER — ONDANSETRON 2 MG/ML
4 INJECTION INTRAMUSCULAR; INTRAVENOUS
Status: DISCONTINUED | OUTPATIENT
Start: 2021-12-28 | End: 2021-12-29 | Stop reason: HOSPADM

## 2021-12-28 RX ORDER — MIDAZOLAM HYDROCHLORIDE 1 MG/ML
1 INJECTION, SOLUTION INTRAMUSCULAR; INTRAVENOUS AS NEEDED
Status: DISCONTINUED | OUTPATIENT
Start: 2021-12-28 | End: 2021-12-28 | Stop reason: HOSPADM

## 2021-12-28 RX ORDER — ROSUVASTATIN CALCIUM 10 MG/1
5 TABLET, COATED ORAL
Status: DISCONTINUED | OUTPATIENT
Start: 2021-12-28 | End: 2021-12-29 | Stop reason: HOSPADM

## 2021-12-28 RX ORDER — PROPOFOL 10 MG/ML
INJECTION, EMULSION INTRAVENOUS
Status: DISCONTINUED | OUTPATIENT
Start: 2021-12-28 | End: 2021-12-28 | Stop reason: HOSPADM

## 2021-12-28 RX ORDER — ALBUTEROL SULFATE 0.83 MG/ML
2.5 SOLUTION RESPIRATORY (INHALATION)
Status: DISCONTINUED | OUTPATIENT
Start: 2021-12-28 | End: 2021-12-29 | Stop reason: HOSPADM

## 2021-12-28 RX ORDER — GLYCOPYRROLATE 0.2 MG/ML
0.2 INJECTION INTRAMUSCULAR; INTRAVENOUS
Status: COMPLETED | OUTPATIENT
Start: 2021-12-28 | End: 2021-12-28

## 2021-12-28 RX ORDER — HYDROCODONE BITARTRATE AND ACETAMINOPHEN 5; 325 MG/1; MG/1
.5-1 TABLET ORAL
Status: DISCONTINUED | OUTPATIENT
Start: 2021-12-28 | End: 2021-12-29 | Stop reason: HOSPADM

## 2021-12-28 RX ORDER — OXYCODONE HYDROCHLORIDE 5 MG/1
2.5 TABLET ORAL
Status: DISCONTINUED | OUTPATIENT
Start: 2021-12-28 | End: 2021-12-28

## 2021-12-28 RX ORDER — FENTANYL CITRATE 50 UG/ML
25 INJECTION, SOLUTION INTRAMUSCULAR; INTRAVENOUS
Status: DISCONTINUED | OUTPATIENT
Start: 2021-12-28 | End: 2021-12-28 | Stop reason: HOSPADM

## 2021-12-28 RX ORDER — ROPIVACAINE HYDROCHLORIDE 5 MG/ML
30 INJECTION, SOLUTION EPIDURAL; INFILTRATION; PERINEURAL AS NEEDED
Status: DISCONTINUED | OUTPATIENT
Start: 2021-12-28 | End: 2021-12-28 | Stop reason: HOSPADM

## 2021-12-28 RX ORDER — FACIAL-BODY WIPES
10 EACH TOPICAL DAILY PRN
Status: DISCONTINUED | OUTPATIENT
Start: 2021-12-30 | End: 2021-12-29 | Stop reason: HOSPADM

## 2021-12-28 RX ORDER — MAGNESIUM SULFATE 100 %
4 CRYSTALS MISCELLANEOUS AS NEEDED
Status: DISCONTINUED | OUTPATIENT
Start: 2021-12-28 | End: 2021-12-29 | Stop reason: HOSPADM

## 2021-12-28 RX ORDER — SODIUM CHLORIDE 0.9 % (FLUSH) 0.9 %
5-40 SYRINGE (ML) INJECTION EVERY 8 HOURS
Status: DISCONTINUED | OUTPATIENT
Start: 2021-12-28 | End: 2021-12-29 | Stop reason: HOSPADM

## 2021-12-28 RX ORDER — HYDROMORPHONE HYDROCHLORIDE 1 MG/ML
0.2 INJECTION, SOLUTION INTRAMUSCULAR; INTRAVENOUS; SUBCUTANEOUS
Status: DISCONTINUED | OUTPATIENT
Start: 2021-12-28 | End: 2021-12-28 | Stop reason: HOSPADM

## 2021-12-28 RX ORDER — INSULIN LISPRO 100 [IU]/ML
INJECTION, SOLUTION INTRAVENOUS; SUBCUTANEOUS
Status: DISCONTINUED | OUTPATIENT
Start: 2021-12-28 | End: 2021-12-29 | Stop reason: HOSPADM

## 2021-12-28 RX ORDER — MORPHINE SULFATE 2 MG/ML
2 INJECTION, SOLUTION INTRAMUSCULAR; INTRAVENOUS
Status: DISCONTINUED | OUTPATIENT
Start: 2021-12-28 | End: 2021-12-28 | Stop reason: HOSPADM

## 2021-12-28 RX ORDER — SODIUM CHLORIDE, SODIUM LACTATE, POTASSIUM CHLORIDE, CALCIUM CHLORIDE 600; 310; 30; 20 MG/100ML; MG/100ML; MG/100ML; MG/100ML
INJECTION, SOLUTION INTRAVENOUS
Status: DISCONTINUED | OUTPATIENT
Start: 2021-12-28 | End: 2021-12-28 | Stop reason: HOSPADM

## 2021-12-28 RX ORDER — SODIUM CHLORIDE 9 MG/ML
25 INJECTION, SOLUTION INTRAVENOUS CONTINUOUS
Status: DISCONTINUED | OUTPATIENT
Start: 2021-12-28 | End: 2021-12-28 | Stop reason: HOSPADM

## 2021-12-28 RX ORDER — ASPIRIN 81 MG/1
81 TABLET ORAL 2 TIMES DAILY
Qty: 60 TABLET | Refills: 0 | Status: SHIPPED | OUTPATIENT
Start: 2021-12-28

## 2021-12-28 RX ORDER — DEXTROSE 50 % IN WATER (D50W) INTRAVENOUS SYRINGE
12.5-25 AS NEEDED
Status: DISCONTINUED | OUTPATIENT
Start: 2021-12-28 | End: 2021-12-29 | Stop reason: HOSPADM

## 2021-12-28 RX ORDER — MIDAZOLAM HYDROCHLORIDE 1 MG/ML
0.5 INJECTION, SOLUTION INTRAMUSCULAR; INTRAVENOUS
Status: DISCONTINUED | OUTPATIENT
Start: 2021-12-28 | End: 2021-12-28 | Stop reason: HOSPADM

## 2021-12-28 RX ORDER — SODIUM CHLORIDE 0.9 % (FLUSH) 0.9 %
5-40 SYRINGE (ML) INJECTION AS NEEDED
Status: DISCONTINUED | OUTPATIENT
Start: 2021-12-28 | End: 2021-12-29 | Stop reason: HOSPADM

## 2021-12-28 RX ORDER — BUPIVACAINE HYDROCHLORIDE 5 MG/ML
INJECTION, SOLUTION EPIDURAL; INTRACAUDAL
Status: COMPLETED | OUTPATIENT
Start: 2021-12-28 | End: 2021-12-28

## 2021-12-28 RX ORDER — HYDROCODONE BITARTRATE AND ACETAMINOPHEN 5; 325 MG/1; MG/1
1 TABLET ORAL
Qty: 42 TABLET | Refills: 0 | Status: SHIPPED | OUTPATIENT
Start: 2021-12-28 | End: 2021-12-29

## 2021-12-28 RX ORDER — ONDANSETRON 2 MG/ML
INJECTION INTRAMUSCULAR; INTRAVENOUS AS NEEDED
Status: DISCONTINUED | OUTPATIENT
Start: 2021-12-28 | End: 2021-12-28 | Stop reason: HOSPADM

## 2021-12-28 RX ORDER — LIDOCAINE HYDROCHLORIDE 10 MG/ML
0.1 INJECTION, SOLUTION EPIDURAL; INFILTRATION; INTRACAUDAL; PERINEURAL AS NEEDED
Status: DISCONTINUED | OUTPATIENT
Start: 2021-12-28 | End: 2021-12-28 | Stop reason: HOSPADM

## 2021-12-28 RX ORDER — HYDROCODONE BITARTRATE AND ACETAMINOPHEN 5; 325 MG/1; MG/1
1 TABLET ORAL AS NEEDED
Status: DISCONTINUED | OUTPATIENT
Start: 2021-12-28 | End: 2021-12-28 | Stop reason: HOSPADM

## 2021-12-28 RX ORDER — AMOXICILLIN 250 MG
1 CAPSULE ORAL 2 TIMES DAILY
Status: DISCONTINUED | OUTPATIENT
Start: 2021-12-28 | End: 2021-12-29 | Stop reason: HOSPADM

## 2021-12-28 RX ORDER — HYDROXYZINE HYDROCHLORIDE 10 MG/1
10 TABLET, FILM COATED ORAL
Status: DISCONTINUED | OUTPATIENT
Start: 2021-12-28 | End: 2021-12-29 | Stop reason: HOSPADM

## 2021-12-28 RX ORDER — ROPIVACAINE HYDROCHLORIDE 5 MG/ML
INJECTION, SOLUTION EPIDURAL; INFILTRATION; PERINEURAL
Status: COMPLETED | OUTPATIENT
Start: 2021-12-28 | End: 2021-12-28

## 2021-12-28 RX ORDER — KETOROLAC TROMETHAMINE 30 MG/ML
15 INJECTION, SOLUTION INTRAMUSCULAR; INTRAVENOUS EVERY 6 HOURS
Status: DISCONTINUED | OUTPATIENT
Start: 2021-12-28 | End: 2021-12-29 | Stop reason: HOSPADM

## 2021-12-28 RX ORDER — ASPIRIN 81 MG/1
81 TABLET ORAL 2 TIMES DAILY
Status: DISCONTINUED | OUTPATIENT
Start: 2021-12-28 | End: 2021-12-29 | Stop reason: HOSPADM

## 2021-12-28 RX ORDER — AMOXICILLIN 250 MG
1 CAPSULE ORAL
Qty: 60 TABLET | Refills: 0 | Status: SHIPPED | OUTPATIENT
Start: 2021-12-28

## 2021-12-28 RX ORDER — HYDROMORPHONE HYDROCHLORIDE 1 MG/ML
0.5 INJECTION, SOLUTION INTRAMUSCULAR; INTRAVENOUS; SUBCUTANEOUS
Status: DISCONTINUED | OUTPATIENT
Start: 2021-12-28 | End: 2021-12-29 | Stop reason: HOSPADM

## 2021-12-28 RX ORDER — ALBUTEROL SULFATE 0.83 MG/ML
2.5 SOLUTION RESPIRATORY (INHALATION) AS NEEDED
Status: DISCONTINUED | OUTPATIENT
Start: 2021-12-28 | End: 2021-12-28 | Stop reason: HOSPADM

## 2021-12-28 RX ORDER — PROPOFOL 10 MG/ML
INJECTION, EMULSION INTRAVENOUS AS NEEDED
Status: DISCONTINUED | OUTPATIENT
Start: 2021-12-28 | End: 2021-12-28 | Stop reason: HOSPADM

## 2021-12-28 RX ORDER — ACETAMINOPHEN 500 MG
500 TABLET ORAL
Status: DISCONTINUED | OUTPATIENT
Start: 2021-12-28 | End: 2021-12-29 | Stop reason: HOSPADM

## 2021-12-28 RX ORDER — FENTANYL CITRATE 50 UG/ML
50 INJECTION, SOLUTION INTRAMUSCULAR; INTRAVENOUS AS NEEDED
Status: DISCONTINUED | OUTPATIENT
Start: 2021-12-28 | End: 2021-12-28 | Stop reason: HOSPADM

## 2021-12-28 RX ORDER — NALOXONE HYDROCHLORIDE 0.4 MG/ML
0.4 INJECTION, SOLUTION INTRAMUSCULAR; INTRAVENOUS; SUBCUTANEOUS AS NEEDED
Status: DISCONTINUED | OUTPATIENT
Start: 2021-12-28 | End: 2021-12-29 | Stop reason: HOSPADM

## 2021-12-28 RX ORDER — OXYCODONE HYDROCHLORIDE 5 MG/1
2.5-5 TABLET ORAL
Qty: 42 TABLET | Refills: 0 | Status: SHIPPED
Start: 2021-12-28 | End: 2021-12-28

## 2021-12-28 RX ORDER — DIPHENHYDRAMINE HYDROCHLORIDE 50 MG/ML
12.5 INJECTION, SOLUTION INTRAMUSCULAR; INTRAVENOUS AS NEEDED
Status: DISCONTINUED | OUTPATIENT
Start: 2021-12-28 | End: 2021-12-28 | Stop reason: HOSPADM

## 2021-12-28 RX ORDER — ACETAMINOPHEN 325 MG/1
650 TABLET ORAL ONCE
Status: DISCONTINUED | OUTPATIENT
Start: 2021-12-28 | End: 2021-12-28 | Stop reason: SDUPTHER

## 2021-12-28 RX ORDER — ACETAMINOPHEN 500 MG
1000 TABLET ORAL ONCE
Status: COMPLETED | OUTPATIENT
Start: 2021-12-28 | End: 2021-12-28

## 2021-12-28 RX ORDER — ONDANSETRON 2 MG/ML
4 INJECTION INTRAMUSCULAR; INTRAVENOUS AS NEEDED
Status: DISCONTINUED | OUTPATIENT
Start: 2021-12-28 | End: 2021-12-28 | Stop reason: HOSPADM

## 2021-12-28 RX ORDER — PREGABALIN 75 MG/1
75 CAPSULE ORAL ONCE
Status: COMPLETED | OUTPATIENT
Start: 2021-12-28 | End: 2021-12-28

## 2021-12-28 RX ADMIN — ROPIVACAINE HYDROCHLORIDE 30 ML: 5 INJECTION, SOLUTION EPIDURAL; INFILTRATION; PERINEURAL at 08:40

## 2021-12-28 RX ADMIN — PHENYLEPHRINE HYDROCHLORIDE 40 MCG/MIN: 10 INJECTION INTRAVENOUS at 09:24

## 2021-12-28 RX ADMIN — HYDROCODONE BITARTRATE AND ACETAMINOPHEN 1 TABLET: 5; 325 TABLET ORAL at 19:43

## 2021-12-28 RX ADMIN — SODIUM CHLORIDE 125 ML/HR: 9 INJECTION, SOLUTION INTRAVENOUS at 21:13

## 2021-12-28 RX ADMIN — GLYCOPYRROLATE 0.2 MG: 0.2 INJECTION, SOLUTION INTRAMUSCULAR; INTRAVENOUS at 11:08

## 2021-12-28 RX ADMIN — DOCUSATE SODIUM 50 MG AND SENNOSIDES 8.6 MG 1 TABLET: 8.6; 5 TABLET, FILM COATED ORAL at 19:43

## 2021-12-28 RX ADMIN — BUPIVACAINE HYDROCHLORIDE 12 MG: 5 INJECTION, SOLUTION EPIDURAL; INTRACAUDAL; PERINEURAL at 09:27

## 2021-12-28 RX ADMIN — Medication 10 ML: at 16:00

## 2021-12-28 RX ADMIN — ACETAMINOPHEN 1000 MG: 500 TABLET ORAL at 08:02

## 2021-12-28 RX ADMIN — TRANEXAMIC ACID 1 G: 100 INJECTION, SOLUTION INTRAVENOUS at 09:50

## 2021-12-28 RX ADMIN — ROSUVASTATIN 5 MG: 10 TABLET, FILM COATED ORAL at 22:28

## 2021-12-28 RX ADMIN — SODIUM CHLORIDE, POTASSIUM CHLORIDE, SODIUM LACTATE AND CALCIUM CHLORIDE: 600; 310; 30; 20 INJECTION, SOLUTION INTRAVENOUS at 11:25

## 2021-12-28 RX ADMIN — CEFAZOLIN SODIUM 2 G: 1 INJECTION, POWDER, FOR SOLUTION INTRAMUSCULAR; INTRAVENOUS at 19:44

## 2021-12-28 RX ADMIN — LIDOCAINE HYDROCHLORIDE 0.1 ML: 10 INJECTION, SOLUTION EPIDURAL; INFILTRATION; INTRACAUDAL; PERINEURAL at 08:19

## 2021-12-28 RX ADMIN — ONDANSETRON HYDROCHLORIDE 4 MG: 2 INJECTION, SOLUTION INTRAMUSCULAR; INTRAVENOUS at 11:48

## 2021-12-28 RX ADMIN — PROPOFOL 50 MG: 10 INJECTION, EMULSION INTRAVENOUS at 09:24

## 2021-12-28 RX ADMIN — Medication 15 MG: at 19:44

## 2021-12-28 RX ADMIN — SODIUM CHLORIDE 125 ML/HR: 9 INJECTION, SOLUTION INTRAVENOUS at 13:09

## 2021-12-28 RX ADMIN — SODIUM CHLORIDE, POTASSIUM CHLORIDE, SODIUM LACTATE AND CALCIUM CHLORIDE 1000 ML: 600; 310; 30; 20 INJECTION, SOLUTION INTRAVENOUS at 08:18

## 2021-12-28 RX ADMIN — FENTANYL CITRATE 50 MCG: 50 INJECTION INTRAMUSCULAR; INTRAVENOUS at 08:40

## 2021-12-28 RX ADMIN — MIDAZOLAM HYDROCHLORIDE 2 MG: 1 INJECTION, SOLUTION INTRAMUSCULAR; INTRAVENOUS at 08:40

## 2021-12-28 RX ADMIN — ASPIRIN 81 MG: 81 TABLET, COATED ORAL at 22:28

## 2021-12-28 RX ADMIN — SODIUM CHLORIDE, POTASSIUM CHLORIDE, SODIUM LACTATE AND CALCIUM CHLORIDE: 600; 310; 30; 20 INJECTION, SOLUTION INTRAVENOUS at 09:18

## 2021-12-28 RX ADMIN — Medication 10 ML: at 22:00

## 2021-12-28 RX ADMIN — PREGABALIN 75 MG: 75 CAPSULE ORAL at 08:03

## 2021-12-28 RX ADMIN — ACETAMINOPHEN 500 MG: 500 TABLET ORAL at 22:28

## 2021-12-28 RX ADMIN — PROPOFOL 75 MCG/KG/MIN: 10 INJECTION, EMULSION INTRAVENOUS at 09:24

## 2021-12-28 RX ADMIN — WATER 2 G: 1 INJECTION INTRAMUSCULAR; INTRAVENOUS; SUBCUTANEOUS at 09:35

## 2021-12-28 NOTE — ANESTHESIA PREPROCEDURE EVALUATION
Relevant Problems   RESPIRATORY SYSTEM   (+) Moderate persistent asthma      CARDIOVASCULAR   (+) Essential hypertension, benign      ENDOCRINE   (+) Type 2 diabetes mellitus without complication, without long-term current use of insulin (HCC)       Anesthetic History     PONV          Review of Systems / Medical History  Patient summary reviewed, nursing notes reviewed and pertinent labs reviewed    Pulmonary            Asthma : well controlled       Neuro/Psych   Within defined limits           Cardiovascular    Hypertension        Dysrhythmias       Exercise tolerance: >4 METS     GI/Hepatic/Renal  Within defined limits              Endo/Other    Diabetes    Morbid obesity and arthritis     Other Findings              Physical Exam    Airway  Mallampati: II  TM Distance: > 6 cm  Neck ROM: normal range of motion   Mouth opening: Normal     Cardiovascular  Regular rate and rhythm,  S1 and S2 normal,  no murmur, click, rub, or gallop             Dental  No notable dental hx       Pulmonary  Breath sounds clear to auscultation               Abdominal  GI exam deferred       Other Findings            Anesthetic Plan    ASA: 3  Anesthesia type: spinal      Post-op pain plan if not by surgeon: peripheral nerve block single      Anesthetic plan and risks discussed with: Patient

## 2021-12-28 NOTE — BRIEF OP NOTE
Brief Postoperative Note    Patient: Tiesha Horton  YOB: 1955  MRN: [id number]    Date of Procedure: 12/28/2021     Pre-Op Diagnosis: Osteoarthritis of right knee, unspecified osteoarthritis type [M17.11]    Post-Op Diagnosis: Same as preoperative diagnosis. Procedure(s):  RIGHT TOTAL KNEE ARTHROPLASTY (SPINALW/IV SEDATION)    Surgeon(s):  Irina Lindsey MD    Surgical Assistant: Physician Assistant: Migdalia Hunter PA-C  Surg Asst-1: Cora AMBROCIO    Anesthesia: Spinal     Estimated Blood Loss (mL): 889     Complications: None    Specimens: * No specimens in log *     Implants:   Implant Name Type Inv.  Item Serial No.  Lot No. LRB No. Used Action   BASEPLATE TIB SZ 7 RT KNEE NP STEMMABLE EMPOWR - SNA  BASEPLATE TIB SZ 7 RT KNEE NP STEMMABLE EMPOWR NA [employer] MEDICAL - [facility] [id number] Right 1 Implanted   COMPONENT FEM SZ 7 RT KNEE NP EMPOWR 3D - SNA  COMPONENT FEM SZ 7 RT KNEE NP EMPOWR 3D NA [facility] MEDICAL - DJO SURGICAL_ [id number] Right 1 Implanted   INSERT TIB SZ 7 [id number] RT KNEE EMPOWR 3D E + - SNA  INSERT TIB SZ 7 [id number] RT KNEE EMPOWR 3D E + NA [facility]ICAL - [facility] [id number] Right 1 Implanted   COMPONENT PATELLAR 3 PEG 35X9 MM KNEE DOMED POLYETH E-PLUS - SNA  COMPONENT PATELLAR 3 PEG 35X9 MM KNEE DOMED POLYETH E-PLUS NA [facility] MEDICAL - DJO SURGICAL_ [id number] Right 1 Implanted   CEMENT BNE GENTAMICIN 40 GM SMARTSET GMV - SNA  CEMENT BNE GENTAMICIN 40 GM SMARTSET GMV NA [facility] [employer] ORTHOPED[employer]_ [id number] Right 2 Implanted       Drains: * No LDAs found *    Findings: oa right knee    Electronically Signed by Tico Maharaj PA-C on 12/28/2021 at 11:52 AM
WDL

## 2021-12-28 NOTE — ANESTHESIA PROCEDURE NOTES
Spinal Block    Start time: 12/28/2021 9:25 AM  End time: 12/28/2021 9:28 AM  Performed by: Erasto Cabral CRNA  Authorized by: Erasto Cabral CRNA     Pre-procedure:   Indications: primary anesthetic  Preanesthetic Checklist: patient identified, risks and benefits discussed, anesthesia consent, site marked, patient being monitored and timeout performed    Timeout Time: 09:25 EST          Spinal Block:   Patient Position:  Seated  Prep Region:  Lumbar  Prep: Betadine      Location:  L3-4  Technique:  Single shot    Local Dose (mL):  1.5    Needle:   Needle Type:  Pencan  Needle Gauge:  25 G  Attempts:  1      Events: CSF confirmed and no blood with aspiration        Assessment:  Insertion:  Uncomplicated  Patient tolerance:  Patient tolerated the procedure well with no immediate complications

## 2021-12-28 NOTE — PROGRESS NOTES
Problem: Mobility Impaired (Adult and Pediatric)  Goal: *Acute Goals and Plan of Care (Insert Text)  Description: FUNCTIONAL STATUS PRIOR TO ADMISSION: Patient was independent and active without use of DME in household, used cane in community. HOME SUPPORT PRIOR TO ADMISSION: The patient lived alone with children to provide assistance as needed. Children will be assisting at discharge. Physical Therapy Goals  Initiated 12/28/2021    1. Patient will move from supine to sit and sit to supine  and scoot up and down in bed with modified independence within 4 days. 2. Patient will perform sit to stand with modified independence within 4 days. 3. Patient will ambulate with modified independence for > 150 feet with the least restrictive device within 4 days. 4. Patient will ascend/descend 4 stairs with one handrail(s) with supervision/set-up within 4 days. 5. Patient will perform home exercise program per protocol with supervision/set-up within 4 days. 6. Patient will demonstrate AROM 0-90 degrees in operative joint within 4 days. PHYSICAL THERAPY EVALUATION  Patient: Stefano Ash (85 y.o. female)  Date: 12/28/2021  Primary Diagnosis: Osteoarthritis of right knee, unspecified osteoarthritis type [M17.11]  Primary localized osteoarthritis of right knee [M17.11]  Procedure(s) (LRB):  RIGHT TOTAL KNEE ARTHROPLASTY (SPINALW/IV SEDATION) (Right) Day of Surgery   Precautions:   WBAT    ASSESSMENT  Based on the objective data described below, the patient presents POD# 0 right TKA with right knee pain, decreased AROM/strength and function right leg, decreased activity tolerance, decline in functional mobility and impaired standing balance/gait with RW. Pt s/p Left TKA 7/21. Pt mobilized easily, familiar with post op progression and anticipate will be ready for discharge after am session.     Current Level of Function Impacting Discharge (mobility/balance): Standby guard bed mobility, CGA for transfers/amb with RW    Functional Outcome Measure: The patient scored 55/100 on the Barthel Index outcome measure . Other factors to consider for discharge: Daughter will assist at discharge     Patient will benefit from skilled therapy intervention to address the above noted impairments. PLAN :  Recommendations and Planned Interventions: bed mobility training, transfer training, gait training, therapeutic exercises, patient and family training/education, and therapeutic activities      Frequency/Duration: Patient will be followed by physical therapy:  twice daily to address goals. Recommendation for discharge: (in order for the patient to meet his/her long term goals)  Physical therapy at least 2 days/week in the home     This discharge recommendation:  Has been made in collaboration with the attending provider and/or case management    IF patient discharges home will need the following DME: patient owns DME required for discharge         SUBJECTIVE:   Patient stated I just had my left knee done in July.     OBJECTIVE DATA SUMMARY:   HISTORY:    Past Medical History:   Diagnosis Date    Acne     RESOLVED     Anemia     RESOLVED    Arrhythmia     murmur    Asthma     Cancer ([address]     malignant melanoma of skin on back    DDD (degenerative disc disease), lumbar     Heart palpitations     MURMUR    Hypercholesterolemia     Hypertension     Hypokalemia     RESOLVED    Insomnia     RESOLVED     Macular degeneration of left eye     WET SEEN BY RETINA SPECIALIST     Nausea & vomiting     Osteoarthrosis, unspecified whether generalized or localized, unspecified site 3/16/2011    Polycystic ovaries     Seasonal allergies     Type II diabetes mellitus with complication, uncontrolled ([address] 9/18/2020    Vitamin D deficiency     RESOLVED      Past Surgical History:   Procedure Laterality Date    HX COLONOSCOPY  2010    HX DILATION AND CURETTAGE  1986    HX HYSTERECTOMY  1986    parital    HX KNEE REPLACEMENT Left 07/27/2021 HX OOPHORECTOMY Right 1986    HX OTHER SURGICAL      EXCISION OF MALNOMA     HX TONSILLECTOMY      HX TUBAL LIGATION  1978       Personal factors and/or comorbidities impacting plan of care: as above     Home Situation  Home Environment: Private residence  # Steps to Enter: 0  One/Two Story Residence: One story  Living Alone: Yes  Support Systems: Child(john)  Patient Expects to be Discharged to[de-identified] House  Current DME Used/Available at Home: Cane, straight,Commode, bedside,Grab bars,Shower chair,Walker, rolling,Raised toilet seat    EXAMINATION/PRESENTATION/DECISION MAKING:   Critical Behavior:  Neurologic State: Alert  Orientation Level: Oriented X4  Cognition: Appropriate decision making,Appropriate safety awareness  Safety/Judgement: Awareness of environment,Good awareness of safety precautions    Skin:  Right leg covered with ace wrap - no drainage noted    Range Of Motion:  AROM: Within functional limits (except right knee)                       Strength:    Strength: Within functional limits (except right knee)                    Tone & Sensation:   Tone: Normal              Sensation: Intact               Coordination:  Coordination: Within functional limits  Functional Mobility:  Bed Mobility:     Supine to Sit: Stand-by assistance  Sit to Supine: Stand-by assistance  Scooting: Stand-by assistance  Transfers:  Sit to Stand: Contact guard assistance  Stand to Sit: Contact guard assistance  Stand Pivot Transfers: Contact guard assistance                    Balance:   Sitting: Intact  Standing: Impaired; Without support  Standing - Static: Good;Constant support  Standing - Dynamic : Good;Constant support  Ambulation/Gait Training:  Distance (ft): 40 Feet (ft)  Assistive Device: Walker, rolling;Gait belt  Ambulation - Level of Assistance: Contact guard assistance        Gait Abnormalities: Antalgic;Decreased step clearance  Right Side Weight Bearing: As tolerated  Left Side Weight Bearing: Full  Base of Support: Center of gravity altered;Shift to left  Stance: Right decreased  Speed/Vielka: Slow  Step Length: Right shortened;Left shortened  Swing Pattern: Right asymmetrical           Therapeutic Exercises:   Pt instructed in ankle pumps, quad sets, hamstring sets and heel slides - to be performed once hr 5- 10 reps as tolerated. Pt also demonstrated proper use of incentive spirometer - to be performed x 10 hr when awake. Functional Measure:  Barthel Index:    Bathin  Bladder: 10  Bowels: 10  Groomin  Dressin  Feeding: 10  Mobility: 0  Stairs: 0  Toilet Use: 5  Transfer (Bed to Chair and Back): 10  Total: 55/100       The Barthel ADL Index: Guidelines  1. The index should be used as a record of what a patient does, not as a record of what a patient could do. 2. The main aim is to establish degree of independence from any help, physical or verbal, however minor and for whatever reason. 3. The need for supervision renders the patient not independent. 4. A patient's performance should be established using the best available evidence. Asking the patient, friends/relatives and nurses are the usual sources, but direct observation and common sense are also important. However direct testing is not needed. 5. Usually the patient's performance over the preceding 24-48 hours is important, but occasionally longer periods will be relevant. 6. Middle categories imply that the patient supplies over 50 per cent of the effort. 7. Use of aids to be independent is allowed. Score Interpretation (from [address] [address])    Independent   60-79 Minimally independent   40-59 Partially dependent   20-39 Very dependent   <20 Totally dependent     -Evens Cam., Barthel, D.W. (1965). Functional evaluation: the Barthel Index. [facility] ([address] (1997). The Barthel activities of daily living index: self-reporting versus actual performance in the old (> or = 75 years).  Journal of [facility] Society 45(7), 14 [facility], [facility] FruitlandAdrianne.F, Zach Dickens., Joelle Oglesby. (1999). Measuring the change in disability after inpatient rehabilitation; comparison of the responsiveness of the Barthel Index and Functional Saline Measure. Journal of Neurology, Neurosurgery, and Psychiatry, 66(4), 712-409. SANJANA Stevenson, GERRY Vasquez, & Priya Crawford M.A. (2004) Assessment of post-stroke quality of life in cost-effectiveness studies: The usefulness of the Barthel Index and the EuroQoL-5D. Quality of Life Research, 15, 189-57           Physical Therapy Evaluation Charge Determination   History Examination Presentation Decision-Making   LOW Complexity : Zero comorbidities / personal factors that will impact the outcome / POC LOW Complexity : 1-2 Standardized tests and measures addressing body structure, function, activity limitation and / or participation in recreation  LOW Complexity : Stable, uncomplicated  LOW Complexity : FOTO score of       Based on the above components, the patient evaluation is determined to be of the following complexity level: LOW     Pain Rating:  Right knee 3/10    Activity Tolerance:   Good - POD#0 mobilized without difficulty    After treatment patient left in no apparent distress:   Supine in bed, Call bell within reach, and Side rails x 3    COMMUNICATION/EDUCATION:   The patients plan of care was discussed with: Registered nurse. Fall prevention education was provided and the patient/caregiver indicated understanding., Patient/family have participated as able in goal setting and plan of care. , and Patient/family agree to work toward stated goals and plan of care.     Thank you for this referral.  Ángel Gallegos, PT   Time Calculation: 30 mins

## 2021-12-28 NOTE — PERIOP NOTES
TRANSFER - OUT REPORT:    Verbal report given to RN (name) on Anant Salinas  being transferred to  (unit) for routine post - op       Report consisted of patients Situation, Background, Assessment and   Recommendations(SBAR). Time Pre op antibiotic given: 0935  Anesthesia Stop time: 0760  Desir Present on Transfer to floor: No  Order for Desir on Chart: N/A  Discharge Prescriptions with Chart: N/A    Information from the following report(s) OR Summary, Intake/Output, MAR, Recent Results, Med Rec Status and Cardiac Rhythm SR was reviewed with the receiving nurse. Opportunity for questions and clarification was provided. Is the patient on 02? NO    Is the patient on a monitor? NO    Is the nurse transporting with the patient? NO    Surgical Waiting Area notified of patient's transfer from PACU? YES (Daughter, Indigo Cuenca)      The following personal items collected during your admission accompanied patient upon transfer:   Dental Appliance: Dental Appliances: None  Vision:    Hearing Aid:    Jewelry: Jewelry: None  Clothing: Clothing: Other (comment) (clothing bag and shoes returned to patient in pacu)  Other Valuables:  Other Valuables: Eyeglasses (returned to patient in pacu)  Valuables sent to safe:

## 2021-12-28 NOTE — DISCHARGE SUMMARY
[address]     DISCHARGE SUMMARY     Name: Lidia Up       MR#: [id number]    : 1955  ADMIT DATE: 2021  DISCHARGE DATE: 2021     ADMISSION DIAGNOSIS: Osteoarthritis of right knee, unspecified osteoarthritis type [M17.11]  Primary localized osteoarthritis of right knee [M17.11]     DISCHARGE DIAGNOSIS: Osteoarthritis of right knee, unspecified osteoarthritis type [M17.11]     PROCEDURE PERFORMED: Procedure(s):  RIGHT TOTAL KNEE ARTHROPLASTY (SPINALW/IV SEDATION)     CONSULTATIONS:  None.     HISTORY OF PRESENT ILLNESS: The patient is a 68-year-old female with progressive right knee pain due to severe osteoarthritis of right knee. Symptoms have progressed despite comprehensive conservative treatment. She presents for right total knee replacement. Risks, benefits, alternatives of procedure were reviewed with her in detail and she desires to proceed.     HOSPITAL COURSE:  The patient underwent the aforementioned procedure on date of admission under spinal anesthesia with adductor canal block. There were no immediate postoperative complications. She was started on a multimodal pain regimen and DVT prophylaxis.     DISPOSITION: The patient made slow, steady progress with physical therapy and was appropriate for discharge to Home in stable condition on postoperative day 1. DISCHARGE MEDICATIONS:  Reinitiate preadmission medications. In addition, the patient will be on ASA for DVT prophylaxis and low dose oxycodone and Tylenol for pain. DISCHARGE INSTRUCTIONS:  Detailed printed instructions were provided to the patient. Follow up with Dr. Yany Jameson in approximately 3 weeks. The patient will receive home health physical therapy in the meantime.     Signed by: Leilani Soto PA-C  2021

## 2021-12-28 NOTE — ANESTHESIA POSTPROCEDURE EVALUATION
Procedure(s):  RIGHT TOTAL KNEE ARTHROPLASTY (SPINALW/IV SEDATION). spinal    <BSHSIANPOST>    INITIAL Post-op Vital signs:   Vitals Value Taken Time   /62 12/28/21 1245   Temp 36.7 °C (98.1 °F) 12/28/21 1219   Pulse 58 12/28/21 1258   Resp 12 12/28/21 1258   SpO2 98 % 12/28/21 1258   Vitals shown include unvalidated device data.

## 2021-12-28 NOTE — ANESTHESIA PROCEDURE NOTES
Peripheral Block    Start time: 12/28/2021 8:30 AM  End time: 12/28/2021 8:40 AM  Performed by: Jesus Pedraza MD  Authorized by: Jesus Pedraza MD       Pre-procedure: Indications: at surgeon's request and post-op pain management    Preanesthetic Checklist: patient identified, risks and benefits discussed, site marked, timeout performed, anesthesia consent given and patient being monitored    Timeout Time: 08:30 EST          Block Type:   Block Type:   Adductor canal  Laterality:  Right  Monitoring:  Standard ASA monitoring, continuous pulse ox, frequent vital sign checks, heart rate, oxygen and responsive to questions  Injection Technique:  Single shot  Procedures: ultrasound guided    Patient Position: supine  Prep: chlorhexidine    Location:  Mid thigh  Needle Type:  Stimuplex  Needle Gauge:  22 G  Needle Localization:  Ultrasound guidance  Medication Injected:  Ropivacaine (PF) (NAROPIN)(0.5%) 5 mg/mL injection, 30 mL  Med Admin Time: 12/28/2021 8:40 AM    Assessment:  Number of attempts:  1  Injection Assessment:  Incremental injection every 5 mL, negative aspiration for CSF, no paresthesia, no intravascular symptoms, negative aspiration for blood and local visualized surrounding nerve on ultrasound  Patient tolerance:  Patient tolerated the procedure well with no immediate complications

## 2021-12-28 NOTE — DISCHARGE INSTRUCTIONS
Post-op Discharge Instructions Following Total Joint Replacement  Caden Bush MD  [address]  [phone]  Kaitlin Whitt See Dr. Radha Morrow approximately 3-4 weeks from date of surgery. Call [phone] to make an appointment.  Call Alexx Carrillo RN if you have questions or concerns, [phone]. Activity   Use your walker for ambulation. Weight bearing as tolerated unless instructed otherwise by the physical therapist. Get up every hour you are awake and take a brief walk. Lengthen walking distance daily as your strength improves.  Continue using your walker until seen in the office for your first follow up visit.  Practice your exercises 3 times daily as instructed by the physical therapist. Alexsander Chavarria for 20 minutes after exercising.  No driving until seen in the office for your first follow up visit. Incision Care   The light brown Aquacel surgical dressing is waterproof and is to remain on your incision for 7 days. On the 7th day, carefully lift the edge of the dressing to break the adhesive seal and gently peel it off.  If your Aquacel dressings comes loose or falls off before the 7th day, replace it with a dry sterile gauze dressing and change this dressing daily. Once there is no drainage on the bandage, you mean leave the incision open to air.  You may take a shower with the Aquacel dressing in place. After you remove the Aquacel dressing on day 7, you may continue to shower and get your incision wet in the shower. Do not submerge your incision under water in a bathtub, hot tub, swimming pool, etc. until after you have been evaluated at your first office visit. Medications   Blood Clot Prevention: Take medication as prescribed by your physician for 4 weeks postop.  Pain Management: Take pain medication as prescribed; wean yourself off of pain medication as your pain lessens. Take with food.  You make also take Tylenol every 4-6 hours as needed for pain.   Do not exceed 3 grams (3000mg) per day.  Place an ice bag on or around the incision for 20 minutes on / 20 minutes off as needed throughout the day and night, especially after exercising.  Stool Softener: You may want to take a stool softener (such as Senokot-S or Colace) to prevent constipation while taking pain medication. If constipation occurs, you may also use a laxative (such as Dulcolax tablets, Miralax, or a suppository). Diet   Resume usual diet at home. Drink plenty of fluids. Eat foods high in fiber and protein. Calcium and Vitamin D supplements recommended. Avoid alcoholic beverages. No smoking. When to call your Orthopaedic Surgeon: If you call after 5pm or on a weekend, the on call physician will return your call   Pain that is not relieved by pain medication, ice, and activity modification   Signs of infection (red incision, continuous drainage from the incision, malodorous drainage, persistent fever greater than 101 degrees Fahrenheit)   Signs of a blood clot in your leg (calf pain, tenderness, redness, and/or swelling of the lower leg)  ?   When to call your Primary Care Physician   Concerns about your medical conditions such as diabetes, high blood pressure, asthma, congestive heart failure   Call if blood sugars are elevated, if you have a persistent headache or dizziness, coughing or congestion, constipation or diarrhea, burning with urination, abnormal heart rate (fast or slow)  When to call 911 and go to the nearest Emergency Room   Acute onset of chest pain, shortness of breath, difficulty breathing

## 2021-12-29 VITALS
BODY MASS INDEX: 40.04 KG/M2 | WEIGHT: 226 LBS | TEMPERATURE: 98.7 F | OXYGEN SATURATION: 97 % | RESPIRATION RATE: 18 BRPM | DIASTOLIC BLOOD PRESSURE: 62 MMHG | SYSTOLIC BLOOD PRESSURE: 110 MMHG | HEART RATE: 86 BPM | HEIGHT: 63 IN

## 2021-12-29 DIAGNOSIS — Z96.651 STATUS POST TOTAL KNEE REPLACEMENT, RIGHT: Primary | ICD-10-CM

## 2021-12-29 DIAGNOSIS — Z96.651 STATUS POST TOTAL KNEE REPLACEMENT, RIGHT: ICD-10-CM

## 2021-12-29 LAB
ANION GAP SERPL CALC-SCNC: 7 MMOL/L (ref 5–15)
BUN SERPL-MCNC: 15 MG/DL (ref 6–20)
BUN/CREAT SERPL: 16 (ref 12–20)
CALCIUM SERPL-MCNC: 8.1 MG/DL (ref 8.5–10.1)
CHLORIDE SERPL-SCNC: 95 MMOL/L (ref 97–108)
CO2 SERPL-SCNC: 30 MMOL/L (ref 21–32)
CREAT SERPL-MCNC: 0.95 MG/DL (ref 0.55–1.02)
GLUCOSE BLD STRIP.AUTO-MCNC: 128 MG/DL (ref 65–117)
GLUCOSE SERPL-MCNC: 124 MG/DL (ref 65–100)
HGB BLD-MCNC: 9.1 G/DL (ref 11.5–16)
POTASSIUM SERPL-SCNC: 2.6 MMOL/L (ref 3.5–5.1)
SERVICE CMNT-IMP: ABNORMAL
SODIUM SERPL-SCNC: 132 MMOL/L (ref 136–145)

## 2021-12-29 PROCEDURE — 97116 GAIT TRAINING THERAPY: CPT

## 2021-12-29 PROCEDURE — 80048 BASIC METABOLIC PNL TOTAL CA: CPT

## 2021-12-29 PROCEDURE — 74011000250 HC RX REV CODE- 250: Performed by: PHYSICIAN ASSISTANT

## 2021-12-29 PROCEDURE — 82962 GLUCOSE BLOOD TEST: CPT

## 2021-12-29 PROCEDURE — 36415 COLL VENOUS BLD VENIPUNCTURE: CPT

## 2021-12-29 PROCEDURE — 85018 HEMOGLOBIN: CPT

## 2021-12-29 PROCEDURE — 74011250636 HC RX REV CODE- 250/636: Performed by: PHYSICIAN ASSISTANT

## 2021-12-29 PROCEDURE — 74011250637 HC RX REV CODE- 250/637: Performed by: PHYSICIAN ASSISTANT

## 2021-12-29 RX ORDER — HYDROCODONE BITARTRATE AND ACETAMINOPHEN 5; 325 MG/1; MG/1
1-2 TABLET ORAL
Qty: 42 TABLET | Refills: 0 | Status: SHIPPED | OUTPATIENT
Start: 2021-12-29 | End: 2021-12-29

## 2021-12-29 RX ORDER — POTASSIUM CHLORIDE 750 MG/1
40 TABLET, FILM COATED, EXTENDED RELEASE ORAL
Status: COMPLETED | OUTPATIENT
Start: 2021-12-29 | End: 2021-12-29

## 2021-12-29 RX ORDER — HYDROCODONE BITARTRATE AND ACETAMINOPHEN 5; 325 MG/1; MG/1
1-2 TABLET ORAL
Qty: 42 TABLET | Refills: 0 | Status: SHIPPED | OUTPATIENT
Start: 2021-12-29 | End: 2022-01-05

## 2021-12-29 RX ORDER — NAPROXEN 500 MG/1
500 TABLET ORAL 2 TIMES DAILY WITH MEALS
Qty: 60 TABLET | Refills: 0 | Status: SHIPPED | OUTPATIENT
Start: 2021-12-29

## 2021-12-29 RX ADMIN — DOCUSATE SODIUM 50 MG AND SENNOSIDES 8.6 MG 1 TABLET: 8.6; 5 TABLET, FILM COATED ORAL at 09:54

## 2021-12-29 RX ADMIN — HYDROCODONE BITARTRATE AND ACETAMINOPHEN 1 TABLET: 5; 325 TABLET ORAL at 04:17

## 2021-12-29 RX ADMIN — ACETAMINOPHEN 500 MG: 500 TABLET ORAL at 09:54

## 2021-12-29 RX ADMIN — Medication 10 ML: at 06:00

## 2021-12-29 RX ADMIN — HYDROCODONE BITARTRATE AND ACETAMINOPHEN 1 TABLET: 5; 325 TABLET ORAL at 09:54

## 2021-12-29 RX ADMIN — ASPIRIN 81 MG: 81 TABLET, COATED ORAL at 09:54

## 2021-12-29 RX ADMIN — Medication 15 MG: at 00:03

## 2021-12-29 RX ADMIN — POLYETHYLENE GLYCOL 3350 17 G: 17 POWDER, FOR SOLUTION ORAL at 09:54

## 2021-12-29 RX ADMIN — POTASSIUM CHLORIDE 40 MEQ: 750 TABLET, FILM COATED, EXTENDED RELEASE ORAL at 09:54

## 2021-12-29 RX ADMIN — CEFAZOLIN SODIUM 2 G: 1 INJECTION, POWDER, FOR SOLUTION INTRAMUSCULAR; INTRAVENOUS at 05:15

## 2021-12-29 RX ADMIN — Medication 15 MG: at 05:15

## 2021-12-29 RX ADMIN — HYDROCODONE BITARTRATE AND ACETAMINOPHEN 1 TABLET: 5; 325 TABLET ORAL at 00:04

## 2021-12-29 RX ADMIN — ACETAMINOPHEN 500 MG: 500 TABLET ORAL at 05:15

## 2021-12-29 NOTE — OP NOTES
[address]   OPERATIVE REPORT    Name:  Sherman Flores  MR#:  [id number]  :  1955  ACCOUNT #:  [de-identified]  DATE OF SERVICE:  2021      PREOPERATIVE DIAGNOSIS:  Osteoarthritis, right knee. POSTOPERATIVE DIAGNOSIS:  Osteoarthritis, right knee. PROCEDURE PERFORMED:  Right total knee arthroplasty. SURGEON:  Sean Colbert MD    ASSISTANT:  Lex Gimenez PA-C    ANESTHESIA:  Spinal with sedation as well as adductor canal block. COMPLICATIONS:  None. SPECIMENS REMOVED:  None. IMPLANTS:  Components implanted:  DonJoy Empowr 3-D size 7 femur, size 7 tibial tray with 12-mm polyethylene insert and 35-mm patella. ESTIMATED BLOOD LOSS:  200 mL. INDICATIONS:  The patient is a 78-year-old female with progressive right knee pain due to severe osteoarthritis of right knee. Symptoms have progressed despite comprehensive conservative treatment. She presents for right total knee replacement. Risks, benefits, alternatives of procedure were reviewed with her in detail and she desires to proceed. PROCEDURE IN DETAIL:  Anesthesia team placed an adductor canal block in the right thigh before taking the patient to the operating room, they also placed a spinal.  Preoperative IV antibiotics were administered. Padded pneumatic tourniquet was placed around the right upper thigh. Right lower extremity was prepped and draped in usual sterile fashion. Tourniquet was inflated to 300. Through a midline anterior knee incision, I performed a medial parapatellar arthrotomy. Progressive medial release was performed to facilitate exposure and soft tissue balance throughout the procedure. As soon as the knee was flexed, the tourniquet was released. 10-mm distal femoral resection was performed using OrthAlign to navigate a neutral cut relative to mechanical axis of the femur. PCL was excised. Proximal tibial resection was performed using an extramedullary alignment guide. Menisci were excised. Small medial release was required to produce a symmetrical extension gap with a 12-mm spacer block. Knee was placed in 90 degrees of flexion and gap  was inserted. Soft tissue tension was applied and block was adjusted to produce a 12-mm flexion space. Femoral rotation was drilled and the femur was sized and repaired for a size 7 component utilizing appropriate jig. Posterior osteophytes removed from the femur and subperiosteal posterior capsular release was performed. Trials were inserted and with 12-mm insert in place, the knee had full extension to gravity, satisfactory coronal plane stability and soft tissue tension throughout arc of motion. Patella was prepared for 35 mm component and tracked centrally using no thumbs technique. Tourniquet was reinflated. Trials were removed and tibial preparation was completed. Bony surfaces were copiously irrigated by pulse lavage and dried before the real components were cemented into place using antibiotic impregnated cement. Excess cement was removed and knee was reduced in full extension with the real insert in place during cement setup. Periarticular soft tissues were injected with solution containing 0.5% ropivacaine with epinephrine as well as clonidine and Toradol. The wound was irrigated. Arthrotomy was closed with a combination of heavy Vicryl sutures and a running #2 Stratafix suture. Skin and subcutaneous layers were closed in layered fashion with Vicryl and a running Monocryl subcuticular stitch. Wound was dressed with Dermabond and Aquacel occlusive dressing as well as sterile compressive dressing. The patient's bladder was decompressed with straight catheterization before she was transported to postanesthesia care unit in stable condition. All counts were correct at the end of the procedure. The physician assistant was critical throughout the case to assist with positioning, retraction, and closure.   There were no other available residents, fellows, or surgical assistants available to assist during this procedure. Juan Lloyd MD      [id number]  D:  12/28/2021 19:44  T:  12/28/2021 20:48  JOB #:  [id number]  CC:   MD Amos Trevino MD

## 2021-12-29 NOTE — PROGRESS NOTES
DAJA: Plan for discharge home with [facility]. At [facility] has accepted patient. Patient owns rolling walker. Family will transport home via car. Care Management Interventions  PCP Verified by CM: Yes  Mode of Transport at Discharge: Other (see comment) (family)  Transition of Care Consult (CM Consult): [address]: No  Reason Outside [place]: Physician referred to specific agency  MyChart Signup: No  Discharge Durable Medical Equipment: No  Physical Therapy Consult: Yes  Occupational Therapy Consult: Yes  Speech Therapy Consult: No  Support Systems: Child(john)  Confirm Follow Up Transport: Family  The Patient and/or Patient Representative was Provided with a Choice of Provider and Agrees with the Discharge Plan?: Yes  Freedom of Choice List was Provided with Basic Dialogue that Supports the Patient's Individualized Plan of Care/Goals, Treatment Preferences and Shares the Quality Data Associated with the Providers?: Yes  Discharge Location  Discharge Placement: Home with home health    Reason for Admission: RIGHT TOTAL KNEE ARTHROPLASTY                       RUR Score:   N/A Outpatient                  Plan for utilizing home health: The Plan for Transition of Care is related to the following treatment goals: f/u after SNF    The Patient and/or patient representative  was provided with a choice of provider and agrees   with the discharge plan. [x] Yes [] No    Freedom of choice list was provided with basic dialogue that supports the patient's individualized plan of care/goals, treatment preferences and shares the quality data associated with the providers.  [x] Yes [] No      PCP: First and Last name:  Tanesha Garcia MD     Name of Practice:    Are you a current patient: Yes/No: yes   Approximate date of last visit: last week   Can you participate in a virtual visit with your PCP:                     Current Advanced Directive/Advance Care Plan: Full Code      Healthcare Decision Maker:   Click here to complete [address] including selection of the Healthcare Decision Maker Relationship (ie \"Primary\")                             Transition of Care Plan:     Home with home health                   Medicare Outpatient Observation Notice (MOON) provided to patient/representative with verbal explanation of the notice. Time allotted for questions regarding the notice. Patient /representative provided a completed copy of the MOON notice. Copy placed on bedside chart. Patient stated that her Medicare should be primary and Michelle Solorzanoa secondary. CM called patient admitting, and they are adjusting coverage.     MELISSA White/CRM

## 2021-12-29 NOTE — PROGRESS NOTES
Problem: Mobility Impaired (Adult and Pediatric)  Goal: *Acute Goals and Plan of Care (Insert Text)  Description: FUNCTIONAL STATUS PRIOR TO ADMISSION: Patient was independent and active without use of DME in household, used cane in community. HOME SUPPORT PRIOR TO ADMISSION: The patient lived alone with children to provide assistance as needed. Children will be assisting at discharge. Physical Therapy Goals  Initiated 12/28/2021    1. Patient will move from supine to sit and sit to supine  and scoot up and down in bed with modified independence within 4 days. 2. Patient will perform sit to stand with modified independence within 4 days. 3. Patient will ambulate with modified independence for > 150 feet with the least restrictive device within 4 days. 4. Patient will ascend/descend 4 stairs with one handrail(s) with supervision/set-up within 4 days. 5. Patient will perform home exercise program per protocol with supervision/set-up within 4 days. 6. Patient will demonstrate AROM 0-90 degrees in operative joint within 4 days. Outcome: Resolved/Met   PHYSICAL THERAPY TREATMENT/DISCHARGE  Patient: Adama Goodwin (70 y.o. female)  Date: 12/29/2021  Diagnosis: Osteoarthritis of right knee, unspecified osteoarthritis type [M17.11]  Primary localized osteoarthritis of right knee [M17.11] <principal problem not specified>  Procedure(s) (LRB):  RIGHT TOTAL KNEE ARTHROPLASTY (SPINALW/IV SEDATION) (Right) 1 Day Post-Op  Precautions: WBAT  Chart, physical therapy assessment, plan of care and goals were reviewed. ASSESSMENT  Patient continues with skilled PT services and has met acute care PT goals. Patient is cleared for discharge from PT standpoint. Patient  is independent with post-op TKA exercise protocol and has same in written, illlustrated form. Educated patient on Discharge Instructions relating to PT program progression post-discharge. She demonstrated/verbalized understanding.      Barthel Functional Score has improved to 90/100, indicating minimal impaired ability to care for basic self-needs/dependency on others. Other factors to consider for discharge: Motivated/A & O x 4/Supportive Family/Independent PLOF          PLAN :  Patient will be discharged from acute skilled physical therapy at this time. Rationale for discharge:  Goals achieved    Recommendation for discharge: (in order for the patient to meet his/her long term goals)  Physical therapy at least 2 days/week in the home     This discharge recommendation:  Has been made in collaboration with the attending provider and/or case management    IF patient discharges home will need the following DME: patient owns DME required for discharge       SUBJECTIVE:   Patient stated I am going home today.     OBJECTIVE DATA SUMMARY:   Critical Behavior:  Neurologic State: Alert  Orientation Level: Oriented X4  Cognition: Appropriate decision making,Follows commands  Safety/Judgement: Awareness of environment,Good awareness of safety precautions  Functional Mobility Training:  Bed Mobility:     Supine to Sit: Supervision  Sit to Supine: Supervision  Scooting: Supervision        Transfers:  Sit to Stand: Supervision  Stand to Sit: Supervision        Bed to Chair: Supervision                    Balance:  Sitting: Intact  Standing: Intact; With support  Standing - Static: Good  Standing - Dynamic : Good;Constant support  Ambulation/Gait Training:  Distance (ft): 150 Feet (ft)  Assistive Device: Walker, rolling;Gait belt  Ambulation - Level of Assistance: Supervision        Gait Abnormalities: Antalgic  Right Side Weight Bearing: As tolerated     Base of Support: Shift to left  Stance: Right decreased  Speed/Vielka: Slow  Step Length: Left shortened  Swing Pattern: Right asymmetrical          Stairs:  Number of Stairs Trained: 4  Stairs - Level of Assistance: Supervision   Rail Use: Both    Therapeutic Exercises:   Patient  is independent with post-op TkA exercise protocol and has same in written, illlustrated form. Pain Ratin/10    Activity Tolerance:   Good    After treatment patient left in no apparent distress:   Supine in bed, Call bell within reach, Caregiver / family present, Side rails x 3, and nurse notified. COMMUNICATION/COLLABORATION:   The patients plan of care was discussed with: Registered nurse and Case management.      [facility]   Time Calculation: 30 mins

## 2021-12-29 NOTE — PROGRESS NOTES
Orthopaedics Daily Progress Note                            Date of Surgery:  12/28/2021      Patient: Xavier Campos   YOB: 1955  Age: 74 y.o. SUBJECTIVE:   1 Day Post-Op following RIGHT TOTAL KNEE ARTHROPLASTY (SPINALW/IV SEDATION). The patient's post operative pain is controlled. No CP/SOB. No N/V. The patient's mobility will be evaluated today during PT sessions. OBJECTIVE:     Vital Signs:      Visit Vitals  BP (!) 103/59 (BP 1 Location: Right upper arm, BP Patient Position: At rest)   Pulse 64   Temp 97.9 °F (36.6 °C)   Resp 18   Ht 5' 3\" (1.6 m)   Wt 226 lb (102.5 kg)   SpO2 97%   BMI 40.03 kg/m²       Physical Exam:  General: A&Ox3. The patient is cooperative, and in no acute distress. Respiratory: Respirations are unlabored. Surgical site(s): dressing clean, dry  Musculoskeletal: Calves are soft, supple, and non-tender upon palpation. Motor 5/5. Neurological:  Neurovascularly intact with good dorsi and plantar flexion.     Pulses symmetrical.    Laboratory Values:             Recent Results (from the past 12 hour(s))   GLUCOSE, POC    Collection Time: 12/28/21  9:01 PM   Result Value Ref Range    Glucose (POC) 150 (H) 65 - 117 mg/dL    Performed by Didi Saul [facility]    METABOLIC PANEL, BASIC    Collection Time: 12/29/21  5:21 AM   Result Value Ref Range    Sodium 132 (L) 136 - 145 mmol/L    Potassium 2.6 (LL) 3.5 - 5.1 mmol/L    Chloride 95 (L) 97 - 108 mmol/L    CO2 30 21 - 32 mmol/L    Anion gap 7 5 - 15 mmol/L    Glucose 124 (H) 65 - 100 mg/dL    BUN 15 6 - 20 MG/DL    Creatinine 0.95 0.55 - 1.02 MG/DL    BUN/Creatinine ratio 16 12 - 20      GFR est AA >60 >60 ml/min/1.73m2    GFR est non-AA 59 (L) >60 ml/min/1.73m2    Calcium 8.1 (L) 8.5 - 10.1 MG/DL   HEMOGLOBIN    Collection Time: 12/29/21  5:21 AM   Result Value Ref Range    HGB 9.1 (L) 11.5 - 16.0 g/dL   GLUCOSE, POC    Collection Time: 12/29/21  6:10 AM   Result Value Ref Range    Glucose (POC) 128 (H) 65 - 117 mg/dL    Performed by [facility]          PLAN:     S/P RIGHT TOTAL KNEE ARTHROPLASTY (SPINALW/IV SEDATION) -Continue WBAT. -Mobilize and continue with PT/OT until discharged  Low K, will give supplement today     Hemodynamics Hgb today is 9.1. Acute blood loss anemia as expected. Patient asymptomatic. Continue to monitor. Wound Monitor postop dressing; no postop dressing changes necessary. Reinforce PRN. Post Operative Pain Pain Control: stable, mild-to-moderate joint symptoms intermittently, reasonably well controlled by current meds. DVT Prophylaxis Continue with SCD'S, Ankle Pump Exercises.  ASA 81mg BID     Discharge Disposition Discharge plan: Home with HHPT pending PT clearance today       Signed By: Maddy Degroot PA-C  December 29, 2021 8:05 AM

## 2022-01-21 ENCOUNTER — OFFICE VISIT (OUTPATIENT)
Dept: ORTHOPEDIC SURGERY | Age: 67
End: 2022-01-21
Payer: MEDICARE

## 2022-01-21 VITALS — HEIGHT: 63 IN | WEIGHT: 225 LBS | BODY MASS INDEX: 39.87 KG/M2

## 2022-01-21 DIAGNOSIS — Z96.651 STATUS POST TOTAL KNEE REPLACEMENT, RIGHT: Primary | ICD-10-CM

## 2022-01-21 DIAGNOSIS — Z09 SURGERY FOLLOW-UP: ICD-10-CM

## 2022-01-21 PROCEDURE — 99024 POSTOP FOLLOW-UP VISIT: CPT | Performed by: ORTHOPAEDIC SURGERY

## 2022-01-21 NOTE — PROGRESS NOTES
Godwin Dale (: 1955) is a 74 y.o. female, patient, here for evaluation of the following chief complaint(s):  Surgical Follow-up (RTK #1 F/U)       SUBJECTIVE/OBJECTIVE:  Godwin Dale presents today for routine follow-up approximately 3 weeks out from right primary total knee replacement. Overall she is very happy with her progress. She stopped taking pain medicine a few days after surgery. Continues Tylenol, occasional naproxen. She is having her last home therapy visit today. Relates significant improvement in overall mobility as well as pain. PHYSICAL EXAM:  Vitals: Ht 5' 3\" (1.6 m)   Wt 225 lb (102.1 kg)   BMI 39.86 kg/m²   Body mass index is 39.86 kg/m². 74y.o. year old F, no distress. Ambulates without a limp, no cane walking down the tam. Right anterior knee incision is well-healed. Mild residual local knee warmth and swelling. Has full active and passive extension with flexion to approximately 115 degrees. Patella tracks centrally. Stable throughout arc of motion. No distal edema. No calf tenderness. IMAGING:  Radiographs: XR Results (most recent):  Results from Appointment encounter on 22    XR KNEE RT 3 V    Narrative  3 x-ray views right knee including AP, lateral, sunrise demonstrate satisfactory position alignment of cemented cruciate substituting total knee components. Patella tracks centrally. ASSESSMENT/PLAN:  1. Status post total knee replacement, right  -     REFERRAL TO PHYSICAL THERAPY  -     XR KNEE RT 3 V; Future  2. Surgery follow-up    The xray and exam findings were discussed with the patient today. Satisfactory progress 3 weeks postop. Transition to outpatient therapy. Continue Tylenol and naproxen for nighttime pain as needed. Discontinue aspirin next week. Return in 5 weeks for routine clinical recheck, sooner if needed. Return in about 5 weeks (around 2022).             Review Of Systems  ROS     Positive for: Musculoskeletal    Last edited by Annette Menjivar RN on 1/21/2022  9:25 AM. (History)         Patient denies any recent fever, chills, nausea, vomiting, chest pain, or shortness of breath. Allergies   Allergen Reactions    Sulfa (Sulfonamide Antibiotics) Hives    Tape [Adhesive] Other (comments)     RASH AND TAPE BURNS    Wellbutrin [Bupropion Hcl] Other (comments)     Hypertensive crisis       Current Outpatient Medications   Medication Sig    naproxen (NAPROSYN) 500 mg tablet Take 1 Tablet by mouth two (2) times daily (with meals).  aspirin delayed-release 81 mg tablet Take 1 Tablet by mouth two (2) times a day. Indications: blood clot prevention    senna-docusate (PERICOLACE) 8.6-50 mg per tablet Take 1 Tablet by mouth two (2) times daily as needed for Constipation.  albuterol sulfate (PROVENTIL;VENTOLIN) 2.5 mg/0.5 mL nebu nebulizer solution by Nebulization route every four (4) hours as needed for Wheezing.  b complex vitamins tablet Take 1 Tablet by mouth daily.  cetirizine (ZyrTEC) 10 mg tablet Take 10 mg by mouth daily.  acetaminophen (TYLENOL) 500 mg tablet Take 1 Tablet by mouth every four (4) hours.  Janumet 50-1,000 mg per tablet TAKE 1 TABLET TWICE A DAY (Patient taking differently: 0.5 Tablets two (2) times daily (with meals). )    fluticasone propion-salmeteroL (Advair Diskus) 250-50 mcg/dose diskus inhaler Take 1 Puff by inhalation every twelve (12) hours.  albuterol (PROVENTIL HFA, VENTOLIN HFA, PROAIR HFA) 90 mcg/actuation inhaler Take 2 Puffs by inhalation every four (4) hours as needed for Wheezing. Indications: asthma attack    chlorthalidone (HYGROTON) 25 mg tablet TAKE ONE TABLET BY MOUTH DAILY    vit C/E/Zn/coppr/lutein/zeaxan (PRESERVISION AREDS-2 PO) Take 1 Tablet by mouth two (2) times a day.  rosuvastatin (CRESTOR) 5 mg tablet Take 1 Tab by mouth nightly.  fluoride, sodium, (Denta 5000 Plus) 1.1 % crea by Dental route.     fluticasone propionate (Flonase Allergy Relief) 50 mcg/actuation nasal spray 2 Sprays by Both Nostrils route daily.  fluocinoNIDE (LIDEX) 0.05 % topical cream Apply  to affected area two (2) times a day. (Patient not taking: Reported on 12/17/2021)    lancets (One Touch Delica) 33 gauge misc by Does Not Apply route.  glucose blood VI test strips (OneTouch Ultra Blue Test Strip) strip by Does Not Apply route See Admin Instructions.  Blood-Glucose Meter (OneTouch Ultra2 Meter) misc by Does Not Apply route.  Inhalational Spacing Device (POCKET CHAMBER) by Does Not Apply route as needed. (Patient not taking: Reported on 7/27/2021)    clobetasol (TEMOVATE) 0.05 % ointment Apply  to affected area two (2) times a day. (Patient not taking: Reported on 12/17/2021)    cholecalciferol, vitamin D3, (VITAMIN D-3) 2,000 unit Tab Take  by mouth. No current facility-administered medications for this visit.        Past Medical History:   Diagnosis Date    Acne     RESOLVED     Anemia     RESOLVED    Arrhythmia     murmur    Asthma     Cancer ([address]     malignant melanoma of skin on back    DDD (degenerative disc disease), lumbar     Heart palpitations     MURMUR    Hypercholesterolemia     Hypertension     Hypokalemia     RESOLVED    Insomnia     RESOLVED     Macular degeneration of left eye     WET SEEN BY RETINA SPECIALIST     Nausea & vomiting     Osteoarthrosis, unspecified whether generalized or localized, unspecified site 3/16/2011    Polycystic ovaries     Seasonal allergies     Type II diabetes mellitus with complication, uncontrolled ([address] 9/18/2020    Vitamin D deficiency     RESOLVED         Past Surgical History:   Procedure Laterality Date    HX COLONOSCOPY  2010    HX DILATION AND CURETTAGE  1986    HX HYSTERECTOMY  1986    parital    HX KNEE REPLACEMENT Left 07/27/2021    HX OOPHORECTOMY Right 1986    HX OTHER SURGICAL      EXCISION OF [address]     HX TONSILLECTOMY      HX TUBAL LIGATION  1978 Family History   Problem Relation Age of Onset    Heart Disease Father     Diabetes Brother     Heart Disease Brother     Heart Disease Sister         MI     Anesth Problems Neg Hx         Social History     Socioeconomic History    Marital status:      Spouse name: Not on file    Number of children: Not on file    Years of education: Not on file    Highest education level: Not on file   Occupational History    Not on file   Tobacco Use    Smoking status: Never Smoker    Smokeless tobacco: Never Used   Vaping Use    Vaping Use: Never used   Substance and Sexual Activity    Alcohol use: No    Drug use: Never    Sexual activity: Not Currently   Other Topics Concern    Not on file   Social History Narrative    Not on file     Social Determinants of Health     Financial Resource Strain:     Difficulty of Paying Living Expenses: Not on file   Food Insecurity:     Worried About Running Out of Food in the Last Year: Not on file    Bob of Food in the Last Year: Not on file   Transportation Needs:     Lack of Transportation (Medical): Not on file    Lack of Transportation (Non-Medical):  Not on file   Physical Activity:     Days of Exercise per Week: Not on file    Minutes of Exercise per Session: Not on file   Stress:     Feeling of Stress : Not on file   Social Connections:     Frequency of Communication with Friends and Family: Not on file    Frequency of Social Gatherings with Friends and Family: Not on file    Attends Taoism Services: Not on file    Active Member of Clubs or Organizations: Not on file    Attends Club or Organization Meetings: Not on file    Marital Status: Not on file   Intimate Partner Violence:     Fear of Current or Ex-Partner: Not on file    Emotionally Abused: Not on file    Physically Abused: Not on file    Sexually Abused: Not on file   Housing Stability:     Unable to Pay for Housing in the Last Year: Not on file    Number of [facility] in the Last Year: Not on file    Unstable Housing in the Last Year: Not on file       Orders Placed This Encounter    XR KNEE RT 3 V     Standing Status:   Future     Number of Occurrences:   1     Standing Expiration Date:   1/22/2023    REFERRAL TO PHYSICAL THERAPY     Referral Priority:   Routine     Referral Type:   PT/OT/ST     Referral Reason:   Specialty Services Required     Requested Specialty:   Physical Therapy     Number of Visits Requested:   1        An electronic signature was used to authenticate this note.   -- Coral Arriaza MD

## 2022-02-25 ENCOUNTER — OFFICE VISIT (OUTPATIENT)
Dept: ORTHOPEDIC SURGERY | Age: 67
End: 2022-02-25
Payer: COMMERCIAL

## 2022-02-25 VITALS — HEIGHT: 63 IN | WEIGHT: 225 LBS | BODY MASS INDEX: 39.87 KG/M2

## 2022-02-25 DIAGNOSIS — Z96.651 STATUS POST TOTAL KNEE REPLACEMENT, RIGHT: Primary | ICD-10-CM

## 2022-02-25 DIAGNOSIS — Z09 SURGERY FOLLOW-UP: ICD-10-CM

## 2022-02-25 PROCEDURE — 99024 POSTOP FOLLOW-UP VISIT: CPT | Performed by: ORTHOPAEDIC SURGERY

## 2022-02-25 RX ORDER — LISINOPRIL 10 MG/1
TABLET ORAL
COMMUNITY
Start: 2022-02-15

## 2022-02-25 NOTE — PROGRESS NOTES
Mirian Cantu (: 1955) is a 74 y.o. female, patient, here for evaluation of the following chief complaint(s):  Surgical Follow-up (RTK #2 F/U)       SUBJECTIVE/OBJECTIVE:  Mirain Cantu presents today for routine follow-up approximately 6 to 7 weeks out from right primary total knee replacement. Overall she is happy with her progress. She relates some mild aching at night, no pain during the day. She transitioned from outpatient PT to home exercise program about 3 weeks ago. Relates significant improvement in overall mobility. She has had no difficulty with range of motion. PHYSICAL EXAM:  Vitals: Ht 5' 2.5\" (1.588 m)   Wt 225 lb (102.1 kg)   BMI 40.50 kg/m²   Body mass index is 40.5 kg/m². 74y.o. year old F, no distress. Ambulates without a limp. Right anterior knee incision is well-healed. Trace residual local warmth at the knee, no swelling. Full active and passive extension with flexion to approximately 120 125 degrees. Stable throughout arc of motion. No distal edema. No calf tenderness. IMAGING:  Radiographs: No new images today. ASSESSMENT/PLAN:  1. Status post total knee replacement, right  2. Surgery follow-up    The xray and exam findings were discussed with the patient today. Satisfactory progress. Continue with home exercise program and low impact activities as tolerated. Return in January of next year. We will count that is her 1 year postop visit for both total knees. No follow-ups on file. Review Of Systems  ROS     Positive for: Musculoskeletal    Last edited by Brock Franco RN on 2022  8:30 AM. (History)         Patient denies any recent fever, chills, nausea, vomiting, chest pain, or shortness of breath.     Allergies   Allergen Reactions    Sulfa (Sulfonamide Antibiotics) Hives    Tape [Adhesive] Other (comments)     RASH AND TAPE BURNS    Wellbutrin [Bupropion Hcl] Other (comments)     Hypertensive crisis       Current Outpatient Medications   Medication Sig    lisinopriL (PRINIVIL, ZESTRIL) 10 mg tablet     naproxen (NAPROSYN) 500 mg tablet Take 1 Tablet by mouth two (2) times daily (with meals).  aspirin delayed-release 81 mg tablet Take 1 Tablet by mouth two (2) times a day. Indications: blood clot prevention    albuterol sulfate (PROVENTIL;VENTOLIN) 2.5 mg/0.5 mL nebu nebulizer solution by Nebulization route every four (4) hours as needed for Wheezing.  b complex vitamins tablet Take 1 Tablet by mouth daily.  cetirizine (ZyrTEC) 10 mg tablet Take 10 mg by mouth daily.  acetaminophen (TYLENOL) 500 mg tablet Take 1 Tablet by mouth every four (4) hours.  Janumet 50-1,000 mg per tablet TAKE 1 TABLET TWICE A DAY (Patient taking differently: 0.5 Tablets two (2) times daily (with meals). )    fluticasone propion-salmeteroL (Advair Diskus) 250-50 mcg/dose diskus inhaler Take 1 Puff by inhalation every twelve (12) hours.  albuterol (PROVENTIL HFA, VENTOLIN HFA, PROAIR HFA) 90 mcg/actuation inhaler Take 2 Puffs by inhalation every four (4) hours as needed for Wheezing. Indications: asthma attack    vit C/E/Zn/coppr/lutein/zeaxan (PRESERVISION AREDS-2 PO) Take 1 Tablet by mouth two (2) times a day.  rosuvastatin (CRESTOR) 5 mg tablet Take 1 Tab by mouth nightly.  fluoride, sodium, (Denta 5000 Plus) 1.1 % crea by Dental route.  fluocinoNIDE (LIDEX) 0.05 % topical cream Apply  to affected area two (2) times a day.  Blood-Glucose Meter (OneTouch Ultra2 Meter) misc by Does Not Apply route.  Inhalational Spacing Device (POCKET CHAMBER) by Does Not Apply route as needed.  clobetasol (TEMOVATE) 0.05 % ointment Apply  to affected area two (2) times a day.  cholecalciferol, vitamin D3, (VITAMIN D-3) 2,000 unit Tab Take  by mouth.  senna-docusate (PERICOLACE) 8.6-50 mg per tablet Take 1 Tablet by mouth two (2) times daily as needed for Constipation.     chlorthalidone (HYGROTON) 25 mg tablet TAKE ONE TABLET BY MOUTH DAILY    fluticasone propionate (Flonase Allergy Relief) 50 mcg/actuation nasal spray 2 Sprays by Both Nostrils route daily.  lancets (One Touch Delica) 33 gauge misc by Does Not Apply route.  glucose blood VI test strips (OneTouch Ultra Blue Test Strip) strip by Does Not Apply route See Admin Instructions. No current facility-administered medications for this visit.        Past Medical History:   Diagnosis Date    Acne     RESOLVED     Anemia     RESOLVED    Arrhythmia     murmur    Asthma     Cancer ([facility] [address]     malignant melanoma of skin on back    DDD (degenerative disc disease), lumbar     Heart palpitations     MURMUR    Hypercholesterolemia     Hypertension     Hypokalemia     RESOLVED    Insomnia     RESOLVED     Macular degeneration of left eye     WET SEEN BY RETINA SPECIALIST     Nausea & vomiting     Osteoarthrosis, unspecified whether generalized or localized, unspecified site 3/16/2011    Polycystic ovaries     Seasonal allergies     Type II diabetes mellitus with complication, uncontrolled ([facility] [address] 9/18/2020    Vitamin D deficiency     RESOLVED         Past Surgical History:   Procedure Laterality Date    HX COLONOSCOPY  2010    HX DILATION AND CURETTAGE  1986    HX HYSTERECTOMY  1986    parital    HX KNEE REPLACEMENT Left 07/27/2021    HX OOPHORECTOMY Right 1986    HX OTHER SURGICAL      EXCISION OF MALNOMA     HX TONSILLECTOMY      HX TUBAL LIGATION  1978       Family History   Problem Relation Age of Onset    Heart Disease Father     Diabetes Brother     Heart Disease Brother     Heart Disease Sister         MI     Anesth Problems Neg Hx         Social History     Socioeconomic History    Marital status:      Spouse name: Not on file    Number of children: Not on file    Years of education: Not on file    Highest education level: Not on file   Occupational History    Not on file   Tobacco Use    Smoking status: Never Smoker    Smokeless tobacco: Never Used   Vaping Use    Vaping Use: Never used   Substance and Sexual Activity    Alcohol use: No    Drug use: Never    Sexual activity: Not Currently   Other Topics Concern    Not on file   Social History Narrative    Not on file     Social Determinants of Health     Financial Resource Strain:     Difficulty of Paying Living Expenses: Not on file   Food Insecurity:     Worried About Running Out of Food in the Last Year: Not on file    Bob of Food in the Last Year: Not on file   Transportation Needs:     Lack of Transportation (Medical): Not on file    Lack of Transportation (Non-Medical): Not on file   Physical Activity:     Days of Exercise per Week: Not on file    Minutes of Exercise per Session: Not on file   Stress:     Feeling of Stress : Not on file   Social Connections:     Frequency of Communication with Friends and Family: Not on file    Frequency of Social Gatherings with Friends and Family: Not on file    Attends [employer] Services: Not on file    Active Member of [address] [place] [address] or Organizations: Not on file    Attends Club or Organization Meetings: Not on file    Marital Status: Not on file   Intimate Partner Violence:     Fear of Current or Ex-Partner: Not on file    Emotionally Abused: Not on file    Physically Abused: Not on file    Sexually Abused: Not on file   Housing Stability:     Unable to Pay for Housing in the Last Year: Not on file    Number of [place] in the Last Year: Not on file    Unstable Housing in the Last Year: Not on file       No orders of the defined types were placed in this encounter. An electronic signature was used to authenticate this note.   -- Celine Sanderson MD

## 2022-03-17 DIAGNOSIS — Z96.651 STATUS POST TOTAL KNEE REPLACEMENT, RIGHT: Primary | ICD-10-CM

## 2022-03-17 RX ORDER — NAPROXEN 500 MG/1
500 TABLET ORAL 2 TIMES DAILY WITH MEALS
Qty: 180 TABLET | Refills: 0 | Status: SHIPPED | OUTPATIENT
Start: 2022-03-17

## 2022-03-18 PROBLEM — E55.9 VITAMIN D DEFICIENCY: Status: ACTIVE | Noted: 2020-09-18

## 2022-03-19 PROBLEM — M17.12 OSTEOARTHRITIS OF LEFT KNEE: Status: ACTIVE | Noted: 2021-07-27

## 2022-03-19 PROBLEM — E66.9 OBESITY (BMI 30-39.9): Status: ACTIVE | Noted: 2020-09-18

## 2022-03-19 PROBLEM — E11.9 TYPE 2 DIABETES MELLITUS WITHOUT COMPLICATION, WITHOUT LONG-TERM CURRENT USE OF INSULIN (HCC): Status: ACTIVE | Noted: 2020-09-18

## 2022-03-20 PROBLEM — M17.11 PRIMARY LOCALIZED OSTEOARTHRITIS OF RIGHT KNEE: Status: ACTIVE | Noted: 2021-12-28

## 2022-03-25 ENCOUNTER — TRANSCRIBE ORDER (OUTPATIENT)
Dept: SCHEDULING | Age: 67
End: 2022-03-25

## 2022-03-25 DIAGNOSIS — Z12.31 VISIT FOR SCREENING MAMMOGRAM: Primary | ICD-10-CM

## 2022-06-08 ENCOUNTER — HOSPITAL ENCOUNTER (OUTPATIENT)
Dept: MAMMOGRAPHY | Age: 67
Discharge: HOME | End: 2022-06-08
Attending: INTERNAL MEDICINE
Payer: MEDICARE

## 2022-06-08 DIAGNOSIS — Z12.31 VISIT FOR SCREENING MAMMOGRAM: ICD-10-CM

## 2022-06-08 PROCEDURE — 77067 SCR MAMMO BI INCL CAD: CPT

## 2022-06-24 ENCOUNTER — TRANSCRIBE ORDER (OUTPATIENT)
Dept: SCHEDULING | Age: 67
End: 2022-06-24

## 2022-06-24 DIAGNOSIS — Z78.0 POSTMENOPAUSAL: Primary | ICD-10-CM

## 2022-07-01 ENCOUNTER — HOSPITAL ENCOUNTER (OUTPATIENT)
Dept: MAMMOGRAPHY | Age: 67
Discharge: HOME | End: 2022-07-01
Attending: INTERNAL MEDICINE
Payer: MEDICARE

## 2022-07-01 DIAGNOSIS — Z78.0 POSTMENOPAUSAL: ICD-10-CM

## 2022-07-01 PROCEDURE — 77080 DXA BONE DENSITY AXIAL: CPT

## 2022-07-14 DIAGNOSIS — Z96.651 STATUS POST TOTAL KNEE REPLACEMENT, RIGHT: Primary | ICD-10-CM

## 2022-07-14 RX ORDER — NAPROXEN 500 MG/1
500 TABLET ORAL 2 TIMES DAILY WITH MEALS
Qty: 180 TABLET | Refills: 0 | Status: SHIPPED | OUTPATIENT
Start: 2022-07-14

## 2023-01-06 ENCOUNTER — OFFICE VISIT (OUTPATIENT)
Dept: ORTHOPEDIC SURGERY | Age: 68
End: 2023-01-06
Payer: MEDICARE

## 2023-01-06 VITALS — HEIGHT: 64 IN | BODY MASS INDEX: 39.23 KG/M2

## 2023-01-06 DIAGNOSIS — Z96.653 STATUS POST TOTAL KNEE REPLACEMENT, BILATERAL: Primary | ICD-10-CM

## 2023-01-06 NOTE — LETTER
1/6/2023    Patient: Luz Martinez   YOB: 1955   Date of Visit: 1/6/2023     Alexandra Frost MD  [address] [address]  Via Fax: [phone]    Dear Alexandra Frost MD,      Thank you for referring Ms. Jeff Osuna to [facility] for evaluation. My notes for this consultation are attached. If you have questions, please do not hesitate to call me. I look forward to following your patient along with you.       Sincerely,    Nicolás Ghosh MD

## 2023-01-06 NOTE — PROGRESS NOTES
Jame Worrell (: 1955) is a 69 y.o. female, patient, here for evaluation of the following chief complaint(s):  Surgical Follow-up (RTK: 21/LTK: 21)       SUBJECTIVE/OBJECTIVE:  Jame Worrell presents today for routine follow-up approximately 12 months out from right total knee replacement, 18 months on the left. Overall she is happy with her function. She relates some tenderness at the proximal pole of the right patella, but denies pain with activities. Denies subjective instability. No aching at rest or at night. Her knees do not limit her. PHYSICAL EXAM:  Vitals: Ht 5' 3.5\" (1.613 m)   BMI 39.23 kg/m²   Body mass index is 39.23 kg/m². 69y.o. year old F, no distress. Ambulates without a limp. Both knees neutral alignment with healed midline anterior scars. No warmth swelling or effusion in either knee. Mild tenderness at the quad tendon insertion on the right side, very focal.  Tenderness is not diffuse across the whole tendon insertion. Full active knee extension bilaterally, with flexion to approximately 120 degrees. Patellae track centrally. Has no pain with resisted active knee extension on the right side. Both knees stable. Symmetrical palpable distal pulses. No gross motor or sensory deficits in lower extremities. No distal edema. IMAGING:  Radiographs: XR Results (most recent):  Results from Appointment encounter on 23    XR KNEES BI MIN 4 V    Narrative  3 x-ray views of both knees including AP, lateral, sunrise demonstrate satisfactory position and alignment of bilateral cemented total knee components. No evidence of loosening in either knee. Patellae track centrally. ASSESSMENT/PLAN:  1. Status post total knee replacement, bilateral  -     XR KNEES BI MIN 4 V; Future    Well-functioning bilateral total knees, approximately 1 year postop. Continue low impact activities as tolerated.   Return in 4 years for routine 5-year postop x-rays of both knees, sooner if needed. No follow-ups on file. Review Of Systems  ROS    Negative for: Constitutional, Gastrointestinal, Neurological, Skin, Genitourinary, Musculoskeletal, HENT, Endocrine, Cardiovascular, Eyes, Respiratory, Psychiatric, Allergic/Imm, Heme/Lymph  Last edited by Darin Eisenmenger on 1/6/2023  8:39 AM.         Patient denies any recent fever, chills, nausea, vomiting, chest pain, or shortness of breath. Allergies   Allergen Reactions    Sulfa (Sulfonamide Antibiotics) Hives    Tape [Adhesive] Other (comments)     RASH AND TAPE BURNS    Wellbutrin [Bupropion Hcl] Other (comments)     Hypertensive crisis       Current Outpatient Medications   Medication Sig    naproxen (NAPROSYN) 500 mg tablet Take 1 Tablet by mouth two (2) times daily (with meals). albuterol sulfate (PROVENTIL;VENTOLIN) 2.5 mg/0.5 mL nebu nebulizer solution by Nebulization route every four (4) hours as needed for Wheezing. b complex vitamins tablet Take 1 Tablet by mouth daily. cetirizine (ZYRTEC) 10 mg tablet Take 10 mg by mouth daily. Janumet 50-1,000 mg per tablet TAKE 1 TABLET TWICE A DAY (Patient taking differently: 0.5 Tablets two (2) times daily (with meals). )    fluticasone propion-salmeteroL (Advair Diskus) 250-50 mcg/dose diskus inhaler Take 1 Puff by inhalation every twelve (12) hours. albuterol (PROVENTIL HFA, VENTOLIN HFA, PROAIR HFA) 90 mcg/actuation inhaler Take 2 Puffs by inhalation every four (4) hours as needed for Wheezing. Indications: asthma attack    vit C/E/Zn/coppr/lutein/zeaxan (PRESERVISION AREDS-2 PO) Take 1 Tablet by mouth two (2) times a day. rosuvastatin (CRESTOR) 5 mg tablet Take 1 Tab by mouth nightly. fluoride, sodium, 1.1 % crea by Dental route. fluocinoNIDE (LIDEX) 0.05 % topical cream Apply  to affected area two (2) times a day. Blood-Glucose Meter misc by Does Not Apply route.     inhalational spacing device by Does Not Apply route as needed. clobetasol (TEMOVATE) 0.05 % ointment Apply  to affected area two (2) times a day. cholecalciferol, vitamin D3, 50 mcg (2,000 unit) tab Take  by mouth. naproxen (NAPROSYN) 500 mg tablet Take 1 Tablet by mouth two (2) times daily (with meals). lisinopriL (PRINIVIL, ZESTRIL) 10 mg tablet     naproxen (NAPROSYN) 500 mg tablet Take 1 Tablet by mouth two (2) times daily (with meals). aspirin delayed-release 81 mg tablet Take 1 Tablet by mouth two (2) times a day. Indications: blood clot prevention    senna-docusate (PERICOLACE) 8.6-50 mg per tablet Take 1 Tablet by mouth two (2) times daily as needed for Constipation. acetaminophen (TYLENOL) 500 mg tablet Take 1 Tablet by mouth every four (4) hours. chlorthalidone (HYGROTON) 25 mg tablet TAKE ONE TABLET BY MOUTH DAILY    fluticasone propionate (Flonase Allergy Relief) 50 mcg/actuation nasal spray 2 Sprays by Both Nostrils route daily. lancets (One Touch Delica) 33 gauge misc by Does Not Apply route. glucose blood VI test strips (OneTouch Ultra Blue Test Strip) strip by Does Not Apply route See Admin Instructions. No current facility-administered medications for this visit.        Past Medical History:   Diagnosis Date    Acne     RESOLVED     Anemia     RESOLVED    Arrhythmia     murmur    Asthma     Cancer ([facility] [address]     malignant melanoma of skin on back    DDD (degenerative disc disease), lumbar     Heart palpitations     MURMUR    Hypercholesterolemia     Hypertension     Hypokalemia     RESOLVED    Insomnia     RESOLVED     Macular degeneration of left eye     WET SEEN BY RETINA SPECIALIST     Nausea & vomiting     Osteoarthrosis, unspecified whether generalized or localized, unspecified site 3/16/2011    Polycystic ovaries     Seasonal allergies     Type II diabetes mellitus with complication, uncontrolled 9/18/2020    Vitamin D deficiency     RESOLVED         Past Surgical History:   Procedure Laterality Date    HX COLONOSCOPY  2010    HX DILATION AND CURETTAGE  1986    HX HYSTERECTOMY  1986    parital    HX KNEE REPLACEMENT Left 07/27/2021    HX KNEE REPLACEMENT Right 12/28/2021    HX OOPHORECTOMY Right 1986    HX OTHER SURGICAL      EXCISION OF MALNOMA     HX TONSILLECTOMY      HX TUBAL LIGATION  1978       Family History   Problem Relation Age of Onset    Heart Disease Father     Diabetes Brother     Heart Disease Brother     Heart Disease Sister         MI     Anesth Problems Neg Hx         Social History     Socioeconomic History    Marital status:      Spouse name: Not on file    Number of children: Not on file    Years of education: Not on file    Highest education level: Not on file   Occupational History    Not on file   Tobacco Use    Smoking status: Never    Smokeless tobacco: Never   Vaping Use    Vaping Use: Never used   Substance and Sexual Activity    Alcohol use: No    Drug use: Never    Sexual activity: Not Currently   Other Topics Concern    Not on file   Social History Narrative    Not on file     Social Determinants of Health     Financial Resource Strain: Not on file   Food Insecurity: Not on file   Transportation Needs: Not on file   Physical Activity: Not on file   Stress: Not on file   Social Connections: Not on file   Intimate Partner Violence: Not on file   Housing Stability: Not on file       Orders Placed This Encounter    XR KNEES BI MIN 4 V     Standing Status:   Future     Standing Expiration Date:   1/7/2024        An electronic signature was used to authenticate this note.   -- Kamron Small MD

## 2023-02-02 DIAGNOSIS — Z96.651 STATUS POST TOTAL KNEE REPLACEMENT, RIGHT: ICD-10-CM

## 2023-02-02 RX ORDER — NAPROXEN 500 MG/1
500 TABLET ORAL 2 TIMES DAILY WITH MEALS
Qty: 180 TABLET | Refills: 0 | Status: SHIPPED | OUTPATIENT
Start: 2023-02-02

## 2023-06-22 ENCOUNTER — HOSPITAL ENCOUNTER (OUTPATIENT)
Facility: HOSPITAL | Age: 68
Discharge: HOME | End: 2023-06-22
Payer: MEDICARE

## 2023-06-22 DIAGNOSIS — Z12.31 VISIT FOR SCREENING MAMMOGRAM: ICD-10-CM

## 2023-06-22 PROCEDURE — 77063 BREAST TOMOSYNTHESIS BI: CPT

## 2024-06-21 ENCOUNTER — HOSPITAL ENCOUNTER (OUTPATIENT)
Facility: HOSPITAL | Age: 69
End: 2024-06-21
Payer: MEDICARE

## 2024-06-21 ENCOUNTER — TRANSCRIBE ORDERS (OUTPATIENT)
Facility: HOSPITAL | Age: 69
End: 2024-06-21

## 2024-06-21 DIAGNOSIS — Z12.31 OTHER SCREENING MAMMOGRAM: Primary | ICD-10-CM

## 2024-06-21 DIAGNOSIS — Z12.31 OTHER SCREENING MAMMOGRAM: ICD-10-CM

## 2024-06-21 PROCEDURE — 77063 BREAST TOMOSYNTHESIS BI: CPT

## 2025-02-28 ENCOUNTER — APPOINTMENT (OUTPATIENT)
Facility: HOSPITAL | Age: 70
End: 2025-02-28
Payer: MEDICARE

## 2025-02-28 ENCOUNTER — HOSPITAL ENCOUNTER (EMERGENCY)
Facility: HOSPITAL | Age: 70
Discharge: HOME | End: 2025-03-01
Attending: STUDENT IN AN ORGANIZED HEALTH CARE EDUCATION/TRAINING PROGRAM
Payer: MEDICARE
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
## 2025-02-28 DIAGNOSIS — S52.571A OTHER CLOSED INTRA-ARTICULAR FRACTURE OF DISTAL END OF RIGHT RADIUS, INITIAL ENCOUNTER: Primary | ICD-10-CM

## 2025-02-28 DIAGNOSIS — S52.614A CLOSED NONDISPLACED FRACTURE OF STYLOID PROCESS OF RIGHT ULNA, INITIAL ENCOUNTER: ICD-10-CM

## 2025-02-28 PROCEDURE — 29125 APPL SHORT ARM SPLINT STATIC: CPT

## 2025-02-28 PROCEDURE — 73090 X-RAY EXAM OF FOREARM: CPT

## 2025-02-28 PROCEDURE — 99283 EMERGENCY DEPT VISIT LOW MDM: CPT

## 2025-03-01 ENCOUNTER — APPOINTMENT (OUTPATIENT)
Facility: HOSPITAL | Age: 70
End: 2025-03-01
Payer: MEDICARE

## 2025-03-01 VITALS
RESPIRATION RATE: 20 BRPM | SYSTOLIC BLOOD PRESSURE: 150 MMHG | OXYGEN SATURATION: 96 % | HEART RATE: 71 BPM | TEMPERATURE: 97.9 F | DIASTOLIC BLOOD PRESSURE: 80 MMHG

## 2025-03-01 PROCEDURE — 73502 X-RAY EXAM HIP UNI 2-3 VIEWS: CPT

## 2025-03-01 PROCEDURE — 73110 X-RAY EXAM OF WRIST: CPT

## 2025-03-01 NOTE — ED NOTES
Condition Stable  Patient discharged to home  Patient education was completed  Education taught to patient and family member  Teaching method used was handout and verbal  Understanding of teaching was good  Patient was discharged ambulatory - patient declined wheelchair   Discharged with family  Valuables were given to patient remained in possession of belongings during stay

## 2025-03-01 NOTE — ED TRIAGE NOTES
Patient arrives c/o right arm pain. Patient states that she stepped on something and fell and when she fell she hurt her right lower arm.      Limited movement of arm     3/10 pain at this time

## 2025-03-02 NOTE — ED PROVIDER NOTES
the reported hip pain following the fall, but less likely as the patient has full range of motion in the hip and no significant findings on hip x-ray.  - Hip fracture: Considered because of the patient's age and hip pain after a fall, but unlikely given the absence of fracture on hip x-ray and full range of motion.  - Wrist sprain: Considered due to wrist pain and swelling, but less likely given the presence of a confirmed distal radius and ulnar styloid fracture on x-ray.  - Scaphoid fracture: Considered due to wrist pain and FOOSH mechanism, but less likely as the x-ray findings did not indicate a scaphoid fracture.    DISPOSITION:  DISCHARGE: HOME    The patient will be discharged home with her right wrist immobilized in a volar splint. She is advised to follow up with orthopedics for definitive management of her intra-articular distal radius fracture and ulnar styloid fracture. It has been explained that if the patient is not improving as expected or if other new symptoms or signs of concern develop, other etiologies or diagnoses may need to be considered, requiring other tests, treatments, consultations, and/or admission. The diagnosis, plan, expected course, follow-up, and return precautions were discussed with the patient and her daughter, and all questions were answered.    DIAGNOSIS:  Primary Diagnosis:   - Intra-articular distal radius fracture    Secondary Diagnoses:  - Ulnar styloid fracture     Phil Dial MD  03/02/25 0253

## 2025-05-02 ENCOUNTER — TRANSCRIBE ORDERS (OUTPATIENT)
Facility: HOSPITAL | Age: 70
End: 2025-05-02

## 2025-05-02 DIAGNOSIS — Z12.31 VISIT FOR SCREENING MAMMOGRAM: Primary | ICD-10-CM

## 2025-06-23 ENCOUNTER — HOSPITAL ENCOUNTER (OUTPATIENT)
Facility: HOSPITAL | Age: 70
Discharge: HOME | End: 2025-06-26
Payer: MEDICARE

## 2025-06-23 DIAGNOSIS — Z12.31 VISIT FOR SCREENING MAMMOGRAM: ICD-10-CM

## 2025-06-23 PROCEDURE — 77067 SCR MAMMO BI INCL CAD: CPT

## (undated) DEVICE — STERILE POLYISOPRENE POWDER-FREE SURGICAL GLOVES WITH EMOLLIENT COATING: Brand: PROTEXIS

## (undated) DEVICE — SUTURE STRATAFIX SPRL SZ 1 L14IN ABSRB VLT L48CM CTX 1/2 SXPD2B405

## (undated) DEVICE — SUTURE MCRYL SZ 3-0 L27IN ABSRB UD L24MM PS-1 3/8 CIR PRIM Y936H

## (undated) DEVICE — GLOVE SURG SZ 85 L12IN FNGR THK94MIL STD WHT LTX FREE

## (undated) DEVICE — SOLUTION SURG PREP 26 CC PURPREP

## (undated) DEVICE — TOTAL TRAY, 16FR 10ML SIL FOLEY, URN: Brand: MEDLINE

## (undated) DEVICE — SOLUTION IRRIG 1000ML STRL H2O USP PLAS POUR BTL

## (undated) DEVICE — MARKER,SKIN,WI/RULER AND LABELS: Brand: MEDLINE

## (undated) DEVICE — T5 HOOD WITH PEEL AWAY FACE SHIELD

## (undated) DEVICE — Device

## (undated) DEVICE — CARTRIDGE BNE CEM MIX UNIV TWR VAC ROTOR BRK OFF NOZ W/O

## (undated) DEVICE — REM POLYHESIVE ADULT PATIENT RETURN ELECTRODE: Brand: VALLEYLAB

## (undated) DEVICE — SYR 10ML LUER LOK 1/5ML GRAD --

## (undated) DEVICE — Z DISCONTINUED USE 2744636  DRESSING AQUACEL 14 IN ALG W3.5XL14IN POLYUR FLM CVR W/ HYDRCOLL

## (undated) DEVICE — SOLUTION IV 50ML 0.9% SOD CHL

## (undated) DEVICE — 3M™ IOBAN™ 2 ANTIMICROBIAL INCISE DRAPE 6651EZ: Brand: IOBAN™ 2

## (undated) DEVICE — SYSTEM NAVIGATION PALM SZ PRECIS ALIGN TECHNOLOGY DISP FOR

## (undated) DEVICE — TIP SUCT CRV REG REDI

## (undated) DEVICE — SUTURE VCRL SZ 0 L27IN ABSRB UD L36MM CT-1 1/2 CIR J260H

## (undated) DEVICE — SPONGE GZ W4XL4IN COT RADPQ HIGHLY ABSRB

## (undated) DEVICE — CATHETER IV 16GA L1 1/4IN PERIPH GRY S STL FEP PLAS HUB THN

## (undated) DEVICE — PREP SKN CHLRAPRP APL 26ML STR --

## (undated) DEVICE — SOLUTION IRRIG 3000ML 0.9% SOD CHL USP UROMATIC PLAS CONT

## (undated) DEVICE — SUTURE VCRL SZ 2-0 L36IN ABSRB UD L40MM CT 1/2 CIR J957H

## (undated) DEVICE — BANDAGE COMPR M W6INXL10YD WHT BGE VELC E MTRX HK AND LOOP

## (undated) DEVICE — SYR 20ML LL STRL LF --

## (undated) DEVICE — PADDING CAST SPEC 6INX4YD COT --

## (undated) DEVICE — GLOVE SURG SZ 65 L12IN FNGR THK94MIL STD WHT LTX FREE

## (undated) DEVICE — SPONGE GZ W4XL4IN COT 12 PLY TYP VII WVN C FLD DSGN

## (undated) DEVICE — DRAPE,EXTREMITY,89X128,STERILE: Brand: MEDLINE

## (undated) DEVICE — SCRUB DRY SURG EZ SCRUB BRUSH PREOPERATIVE GRN

## (undated) DEVICE — ZIMMER® STERILE DISPOSABLE TOURNIQUET CUFF WITH PLC, DUAL PORT, SINGLE BLADDER, 34 IN. (86 CM)

## (undated) DEVICE — TOTAL JOINT - SMH: Brand: MEDLINE INDUSTRIES, INC.

## (undated) DEVICE — CONTAINER,SPECIMEN,4OZ,OR STRL: Brand: MEDLINE

## (undated) DEVICE — NEEDLE HYPO 21GA L1.5IN INTRAMUSCULAR S STL LATCH BVL UP

## (undated) DEVICE — DERMABOND SKIN ADH 0.7ML -- DERMABOND ADVANCED 12/BX

## (undated) DEVICE — STERILE POLYISOPRENE POWDER-FREE SURGICAL GLOVES: Brand: PROTEXIS

## (undated) DEVICE — GOWN,PREVENTION PLUS,XLN/2XL,ST,22/CS: Brand: MEDLINE

## (undated) DEVICE — YANKAUER,FLEXIBLE HANDLE,REGLR CAPACITY: Brand: MEDLINE INDUSTRIES, INC.

## (undated) DEVICE — SUTURE VCRL 1 L27IN ABSRB CT BRAID COAT UD J281H

## (undated) DEVICE — HYPODERMIC SAFETY NEEDLE: Brand: MAGELLAN

## (undated) DEVICE — DRESSING HYDROCOLLOID BORDER 35X10 IN ALUM PRIMASEAL

## (undated) DEVICE — GLOVE SURG SZ 65 L12IN FNGR THK79MIL GRN LTX FREE

## (undated) DEVICE — STRYKER PERFORMANCE SERIES SAGITTAL BLADE: Brand: STRYKER PERFORMANCE SERIES

## (undated) DEVICE — CUSTOM CAST PD STR

## (undated) DEVICE — 4-PORT MANIFOLD: Brand: NEPTUNE 2

## (undated) DEVICE — HANDPIECE SET WITH BONE CLEANING TIP AND SUCTION TUBE: Brand: INTERPULSE

## (undated) DEVICE — GLOVE SURG SZ 85 L12IN FNGR THK79MIL GRN LTX FREE